# Patient Record
Sex: MALE | Race: ASIAN | NOT HISPANIC OR LATINO | Employment: FULL TIME | URBAN - METROPOLITAN AREA
[De-identification: names, ages, dates, MRNs, and addresses within clinical notes are randomized per-mention and may not be internally consistent; named-entity substitution may affect disease eponyms.]

---

## 2017-04-03 ENCOUNTER — OFFICE VISIT (OUTPATIENT)
Dept: FAMILY MEDICINE | Facility: CLINIC | Age: 47
End: 2017-04-03
Payer: COMMERCIAL

## 2017-04-03 ENCOUNTER — RADIANT APPOINTMENT (OUTPATIENT)
Dept: GENERAL RADIOLOGY | Facility: CLINIC | Age: 47
End: 2017-04-03
Attending: PHYSICIAN ASSISTANT
Payer: COMMERCIAL

## 2017-04-03 VITALS
RESPIRATION RATE: 18 BRPM | DIASTOLIC BLOOD PRESSURE: 86 MMHG | WEIGHT: 156 LBS | BODY MASS INDEX: 26.36 KG/M2 | SYSTOLIC BLOOD PRESSURE: 136 MMHG | HEART RATE: 90 BPM | TEMPERATURE: 98 F | OXYGEN SATURATION: 98 %

## 2017-04-03 DIAGNOSIS — J20.9 ACUTE BRONCHITIS, UNSPECIFIED ORGANISM: ICD-10-CM

## 2017-04-03 DIAGNOSIS — R05.9 COUGH: Primary | ICD-10-CM

## 2017-04-03 PROCEDURE — 71020 XR CHEST 2 VW: CPT

## 2017-04-03 PROCEDURE — 99213 OFFICE O/P EST LOW 20 MIN: CPT | Performed by: PHYSICIAN ASSISTANT

## 2017-04-03 RX ORDER — PREDNISONE 20 MG/1
40 TABLET ORAL DAILY
Qty: 10 TABLET | Refills: 0 | Status: SHIPPED | OUTPATIENT
Start: 2017-04-03 | End: 2017-04-08

## 2017-04-03 RX ORDER — CODEINE PHOSPHATE AND GUAIFENESIN 10; 100 MG/5ML; MG/5ML
1 SOLUTION ORAL EVERY 4 HOURS PRN
Qty: 120 ML | Refills: 0 | Status: SHIPPED | OUTPATIENT
Start: 2017-04-03 | End: 2017-05-30

## 2017-04-03 RX ORDER — ALBUTEROL SULFATE 90 UG/1
2 AEROSOL, METERED RESPIRATORY (INHALATION) EVERY 6 HOURS PRN
Qty: 1 INHALER | Refills: 1 | Status: SHIPPED | OUTPATIENT
Start: 2017-04-03 | End: 2017-04-18

## 2017-04-03 NOTE — MR AVS SNAPSHOT
"              After Visit Summary   4/3/2017    Christ Gomes    MRN: [id number]           Patient Information     Date Of Birth          1970        Visit Information        Provider Department      4/3/2017 10:20 AM Tone Vanessa PA-C [facility] [place]        Today's Diagnoses     Cough    -  1    Acute bronchitis, unspecified organism           Follow-ups after your visit        Your next 10 appointments already scheduled     Apr 03, 2017 11:40 AM CDT   XR CHEST 2 VIEWS with [id number]   [facility] [place] ([facility] [place])    [address]  [place] [id number] [phone]           Please bring a list of your current medicines to your exam. (Include vitamins, minerals and over-thecounter medicines.) Leave your valuables at home.  Tell your doctor if there is a chance you may be pregnant.  You do not need to do anything special for this exam.              Who to contact     Normal or non-critical lab and imaging results will be communicated to you by [employer], letter or phone within 4 business days after the clinic has received the results. If you do not hear from us within 7 days, please contact the clinic through [employer] or phone. If you have a critical or abnormal lab result, we will notify you by phone as soon as possible.  Submit refill requests through [employer] or call your pharmacy and they will forward the refill request to us. Please allow 3 business days for your refill to be completed.          If you need to speak with a  for additional information , please call: [phone]             Additional Information About Your Visit        [employer] Information     [employer] lets you send messages to your doctor, view your test results, renew your prescriptions, schedule appointments and more. To sign up, go to [email or web address] . Click on \"Log in\" on the left side of the screen, which will take you to the Welcome " "page. Then click on \"Sign up Now\" on the right side of the page.     You will be asked to enter the access code listed below, as well as some personal information. Please follow the directions to create your username and password.     Your access code is: [email or web address]  Expires: 2017 11:36 AM     Your access code will  in 90 days. If you need help or a new code, please call your [facility] clinic or [phone].        Care EveryWhere ID     This is your Care EveryWhere ID. This could be used by other organizations to access your [place] medical records  [id number]        Your Vitals Were     Pulse Temperature Respirations Pulse Oximetry BMI (Body Mass Index)       90 98  F (36.7  C) (Tympanic) 18 98% 26.36 kg/m2        Blood Pressure from Last 3 Encounters:   17 136/86   16 (!) 124/96   16 132/80    Weight from Last 3 Encounters:   17 156 lb (70.8 kg)   16 155 lb (70.3 kg)   16 158 lb (71.7 kg)              We Performed the Following     XR Chest 2 Views          Today's Medication Changes          These changes are accurate as of: 4/3/17 11:36 AM.  If you have any questions, ask your nurse or doctor.               Start taking these medicines.        Dose/Directions    guaiFENesin-codeine 100-10 MG/5ML Soln solution   Commonly known as:  ROBITUSSIN AC   Used for:  Cough   Started by:  Tone Vanessa PA-C        Dose:  1 tsp.   Take 5 mLs by mouth every 4 hours as needed for cough   Quantity:  120 mL   Refills:  0       predniSONE 20 MG tablet   Commonly known as:  DELTASONE   Used for:  Acute bronchitis, unspecified organism   Started by:  Tone Vanessa PA-C        Dose:  40 mg   Take 2 tablets (40 mg) by mouth daily for 5 days   Quantity:  10 tablet   Refills:  0            Where to get your medicines      These medications were sent to [facility] [id number] [place] [address], [place] - [address] AT [facility] [address]  [address], " [place] [address]     Phone:  [phone]     albuterol 108 (90 BASE) MCG/ACT Inhaler    predniSONE 20 MG tablet         Some of these will need a paper prescription and others can be bought over the counter.  Ask your nurse if you have questions.     Bring a paper prescription for each of these medications     guaiFENesin-codeine 100-10 MG/5ML Soln solution                Primary Care Provider Office Phone # Fax #    Tone Vanessa PA-C [phone]       [facility] [address]  [place] [address]        Thank you!     Thank you for choosing [facility]  for your care. Our goal is always to provide you with excellent care. Hearing back from our patients is one way we can continue to improve our services. Please take a few minutes to complete the written survey that you may receive in the mail after your visit with us. Thank you!             Your Updated Medication List - Protect others around you: Learn how to safely use, store and throw away your medicines at www.disposemymeds.org.          This list is accurate as of: 4/3/17 11:36 AM.  Always use your most recent med list.                   Brand Name Dispense Instructions for use    albuterol 108 (90 BASE) MCG/ACT Inhaler    PROAIR HFA/PROVENTIL HFA/VENTOLIN HFA    1 Inhaler    Inhale 2 puffs into the lungs every 6 hours as needed for shortness of breath / dyspnea or wheezing       guaiFENesin-codeine 100-10 MG/5ML Soln solution    ROBITUSSIN AC    120 mL    Take 5 mLs by mouth every 4 hours as needed for cough       lisinopril 5 MG tablet    PRINIVIL/ZESTRIL    90 tablet    Take 1 tablet (5 mg) by mouth daily       predniSONE 20 MG tablet    DELTASONE    10 tablet    Take 2 tablets (40 mg) by mouth daily for 5 days

## 2017-04-03 NOTE — PROGRESS NOTES
SUBJECTIVE:                                                    Christ Gomes is a 46 year old male who presents to clinic today for the following health issues:      Acute Illness   Acute illness concerns: cough  Onset: 1 week    Fever: no    Chills/Sweats: YES    Headache (location?): YES    Sinus Pressure:no    Conjunctivitis:  no    Ear Pain: no    Rhinorrhea: YES    Congestion: YES    Sore Throat: no     Cough: YES-productive of clear sputum    Wheeze: no    Decreased Appetite: no    Nausea: no    Vomiting: no    Diarrhea:  no    Dysuria/Freq.: no    Fatigue/Achiness: YES    Sick/Strep Exposure: no     Therapies Tried and outcome: cough syrup          Problem list and histories reviewed & adjusted, as indicated.  Additional history: as documented        Reviewed and updated as needed this visit by clinical staff  Tobacco  Allergies  Meds  Med Hx  Surg Hx  Fam Hx  Soc Hx      Reviewed and updated as needed this visit by Provider         All other systems negative except as outline above  OBJECTIVE:  ENT exam reveals - bilateral TM fluid noted, neck without nodes, throat normal without erythema or exudate, sinuses nontender, post nasal drip noted, nasal mucosa congested and nasal mucosa pale and congested.  CHEST:no tachypnea, retractions or cyanosis, mild inspiratory wheezing heard diffusely throughout both lungs, mild expiratory wheezing heard diffusely throughout both lungs and S1, S2 normal, no murmur, no gallop, rate regular.    Christ was seen today for cough.    Diagnoses and all orders for this visit:    Cough  -     guaiFENesin-codeine (ROBITUSSIN AC) 100-10 MG/5ML SOLN solution; Take 5 mLs by mouth every 4 hours as needed for cough    Acute bronchitis, unspecified organism  -     XR Chest 2 Views  -     albuterol (PROAIR HFA/PROVENTIL HFA/VENTOLIN HFA) 108 (90 BASE) MCG/ACT Inhaler; Inhale 2 puffs into the lungs every 6 hours as needed for shortness of breath / dyspnea or wheezing  -     predniSONE  (DELTASONE) 20 MG tablet; Take 2 tablets (40 mg) by mouth daily for 5 days    Other orders  -     Cancel: XR Chest 2 Views; Future      Advised supportive and symptomatic treatment.  Follow up with Provider - if condition persists or worsens.

## 2017-04-18 ENCOUNTER — OFFICE VISIT (OUTPATIENT)
Dept: FAMILY MEDICINE | Facility: CLINIC | Age: 47
End: 2017-04-18
Payer: COMMERCIAL

## 2017-04-18 VITALS
TEMPERATURE: 98.2 F | BODY MASS INDEX: 26.02 KG/M2 | WEIGHT: 156.2 LBS | DIASTOLIC BLOOD PRESSURE: 83 MMHG | OXYGEN SATURATION: 97 % | SYSTOLIC BLOOD PRESSURE: 136 MMHG | HEART RATE: 85 BPM | HEIGHT: 65 IN

## 2017-04-18 DIAGNOSIS — J20.9 ACUTE BRONCHITIS WITH SYMPTOMS > 10 DAYS: Primary | ICD-10-CM

## 2017-04-18 PROCEDURE — 99214 OFFICE O/P EST MOD 30 MIN: CPT | Performed by: NURSE PRACTITIONER

## 2017-04-18 RX ORDER — PREDNISONE 20 MG/1
40 TABLET ORAL DAILY
Qty: 10 TABLET | Refills: 0 | Status: SHIPPED | OUTPATIENT
Start: 2017-04-18 | End: 2017-04-23

## 2017-04-18 RX ORDER — ALBUTEROL SULFATE 90 UG/1
AEROSOL, METERED RESPIRATORY (INHALATION)
Refills: 1 | COMMUNITY
Start: 2017-04-03 | End: 2018-06-18

## 2017-04-18 RX ORDER — AZITHROMYCIN 250 MG/1
TABLET, FILM COATED ORAL
Qty: 6 TABLET | Refills: 0 | Status: SHIPPED | OUTPATIENT
Start: 2017-04-18 | End: 2017-05-30

## 2017-04-18 RX ORDER — BENZONATATE 100 MG/1
100-200 CAPSULE ORAL 3 TIMES DAILY PRN
Qty: 42 CAPSULE | Refills: 0 | Status: SHIPPED | OUTPATIENT
Start: 2017-04-18 | End: 2017-05-30

## 2017-04-18 NOTE — MR AVS SNAPSHOT
"              After Visit Summary   4/18/2017    Gaby Gomes    MRN: [id number]           Patient Information     Date Of Birth          1970        Visit Information        Provider Department      4/18/2017 5:00 PM Viji Scott NP [facility]        Today's Diagnoses     Acute bronchitis with symptoms > 10 days    -  1      Care Instructions      Viêm Ph? Qu?n [Bronchitis, Adult: Abx Tx]  VIÊM PH? QU?N (BRONCHITIS) là debbie?m trùng ???ng khí [ ?ng ph? qu?n  (\"bronchial tubes\")]. Vi?c này th??ng x?y ra khi c?m l?nh thông th??ng. Các tri?u ch?ng g?m ho có ??m [ (phlegm)] và s?t nh?. Viêm ph? qu?n th??ng francis huddleston romina 7 ??n 14 ngày. Các tr??ng h?p nh? có th? ?i?u tr? b?ng các bi?n pháp ch?a tr? ??n gi?n t?i suhail . Debbie?m trùng n?ng h?n ???c ?i?u tr? b?ng tr? sinh.    Ch?m Sóc T?i Suhail :  1) N?u có các tri?u ch?ng nghiêm tr?ng, hãy ngh? ng ?i t?i nhà gaby 2 ??n 3 ngày ??u. Khi qu ý v? ho?t ??ng tr? l?i, ??ng ?? cho quá m?t.  2) ??ng hút thu?c lá. Tránh ti?p xúc v?i khói thu?c c?a ng??i khác.  3) Quý v? có th? dùng acetaminophen (Tylenol) ho?c ibuprofen (Motrin, Advil) ?? ki?m ch? c?n s?t hay ?au, tr? khi ???c ch? ??nh m?t lo?i thu?c khác ?? ch?a tr?. [L?U Ý: N?u quý v? b? b?nh norbert ho?c th?n mãn tính ho?c t?ng b? loét leroy t? (stomach ulcer) ho?c bernard?t ramin?t ???ng tiêu hóa (GI bleeding), h ãy bàn v?i bác s? c?a quý v? tr ??c khi dùng các lo?i thu?c này.]  4) Quý v? có th? b? kém ?n , do ?ó m?t ch? ?? ?n nh? là t?t. Tránh ??ng cho m?t n??c gaby c? th? b?ng cách u?ng t? 6 ??n 8 ly ch?t l?ng m?i ngày (n??c, n??c ng?t, n??c ép trái cây, trà, súp v.v ). Ch?t l?ng s? giúp làm l?ng ch?t bài ti?t gaby ph?i.  5) Thu?c ho bán không c?n toa có ch?a \"dextromethorphan\" (nh? Robitussin DM) và thu?c thông m?i (decongestants) (Actifed ho?c Sudafed) có th? giúp gi?m c?n ho ho?c ngh?t m?i. [L?U Ý: ??ng d ùng thu?c thông m?i n?u quý v? b? conroy ramin?t áp.]  6) U?ng h?t thu?c tr? sinh cho d?u quý v? c?m th?y ?? h ?n romina vài " ngày.  Ti?p T?c Rohan Dõi  v?i bác s? c?a quý v? ho?c rohan nh?ng gì ? ã ???c h??ng d?n n?u không th?y ?? h?n romina ba ngày.  [L?U Ý: N?u quý v? t? 65 tu?i tr? lên, ho?c n?u quý v? b? leslie?n mãn tính (chronic asthma) hay b? b?nh ngh?n ph?i mãn tính (COPD), chúng tôi ?? ngh? quý v? hãy CH?NG NG?A VIÊM PH?I ( PNEUMOCOCCAL VACCINATION) m?i n?m n?m và CH?NG NG?A CÚM [ INFLUENZAVACCINATION (FLU-SHOT)] hàng n?m vào mùa thu. H?i bác s? c?a quý v? v? vi?c này. N?u quý v? ? ã ch?p hình hannah genia?n, bác s? chuyên katja hannah genia?n s? xem l?i vi?c này. Quý v? s? ???c thông báo v? b?t k? khám phá m?i nào có th? ?nh h??ng ??n vi?c ch?m sóc c ?a quý v?.]  N?u x?y ra b?t c? ?i?u nào romina ?ây hãy L?P T?C ?I?U TR? Y T?:  -- S?t trên 100.4  F (38.0  C) quá ba ngày  -- Khó th?, th? khò khè ho?c ?au khi th?  -- Ho ra máu ho?c l??ng ?àm có màu suhail t?ng  -- Y?u ?t, l? m?, nh?c ??u, ?au n?i m?t, ?au lakeshia ho?c c? b? c?ng       8151-5642 [employer]. [address] [place] [address], [place] [address]. All rights reserved. This information is not intended as a substitute for professional medical care. Always follow your healthcare professional's instructions.              Follow-ups after your visit        Follow-up notes from your care team     Return if symptoms worsen or fail to improve.      Who to contact     Normal or non-critical lab and imaging results will be communicated to you by MyChart, letter or phone within 4 business days after the clinic has received the results. If you do not hear from us within 7 days, please contact the clinic through MyChart or phone. If you have a critical or abnormal lab result, we will notify you by phone as soon as possible.  Submit refill requests through [employer] or call your pharmacy and they will forward the refill request to us. Please allow 3 business days for your refill to be completed.          If you need to speak with a  for additional information , please call:  "[phone]             Additional Information About Your Visit        Care EveryWhere ID     This is your Care EveryWhere ID. This could be used by other organizations to access your [place] medical records  [id number]        Your Vitals Were     Pulse Temperature Height Pulse Oximetry BMI (Body Mass Index)       85 98.2  F (36.8  C) (Oral) 5' 4.57\" (1.64 m) 97% 26.34 kg/m2        Blood Pressure from Last 3 Encounters:   04/18/17 136/83   04/03/17 136/86   11/22/16 (!) 124/96    Weight from Last 3 Encounters:   04/18/17 156 lb 3.2 oz (70.9 kg)   04/03/17 156 lb (70.8 kg)   11/22/16 155 lb (70.3 kg)              Today, you had the following     No orders found for display         Today's Medication Changes          These changes are accurate as of: 4/18/17  5:08 PM.  If you have any questions, ask your nurse or doctor.               Start taking these medicines.        Dose/Directions    azithromycin 250 MG tablet   Commonly known as:  ZITHROMAX   Used for:  Acute bronchitis with symptoms > 10 days   Started by:  Viji Scott NP        Two tablets first day, then one tablet daily for four days.   Quantity:  6 tablet   Refills:  0       benzonatate 100 MG capsule   Commonly known as:  TESSALON   Used for:  Acute bronchitis with symptoms > 10 days   Started by:  Viji Scott NP        Dose:  100-200 mg   Take 1-2 capsules (100-200 mg) by mouth 3 times daily as needed for cough   Quantity:  42 capsule   Refills:  0       predniSONE 20 MG tablet   Commonly known as:  DELTASONE   Used for:  Acute bronchitis with symptoms > 10 days   Started by:  Viji Scott NP        Dose:  40 mg   Take 2 tablets (40 mg) by mouth daily for 5 days   Quantity:  10 tablet   Refills:  0         These medicines have changed or have updated prescriptions.        Dose/Directions    albuterol 108 (90 BASE) MCG/ACT Inhaler   This may have changed:  Another medication with the same name was removed. Continue taking this " medication, and follow the directions you see here.   Generic drug:  albuterol   Changed by:  Viji Scott, ABEL        INHALE 2 PUFFS INTO THE LUNGS Q 6 H PRF SOB   Refills:  1            Where to get your medicines      These medications were sent to [id number] - [place], [place] - [address] AT [facility] [address]  [address], [place] [address]     Phone:  [phone]     azithromycin 250 MG tablet    benzonatate 100 MG capsule    predniSONE 20 MG tablet                Primary Care Provider Office Phone # Fax #    Tone Vanessa PA-C [phone]       [facility] [address]  [place] [address]        Thank you!     Thank you for choosing [facility]  for your care. Our goal is always to provide you with excellent care. Hearing back from our patients is one way we can continue to improve our services. Please take a few minutes to complete the written survey that you may receive in the mail after your visit with us. Thank you!             Your Updated Medication List - Protect others around you: Learn how to safely use, store and throw away your medicines at www.disposemymeds.org.          This list is accurate as of: 4/18/17  5:08 PM.  Always use your most recent med list.                   Brand Name Dispense Instructions for use    albuterol 108 (90 BASE) MCG/ACT Inhaler   Generic drug:  albuterol      INHALE 2 PUFFS INTO THE LUNGS Q 6 H PRF SOB       azithromycin 250 MG tablet    ZITHROMAX    6 tablet    Two tablets first day, then one tablet daily for four days.       benzonatate 100 MG capsule    TESSALON    42 capsule    Take 1-2 capsules (100-200 mg) by mouth 3 times daily as needed for cough       guaiFENesin-codeine 100-10 MG/5ML Soln solution    ROBITUSSIN AC    120 mL    Take 5 mLs by mouth every 4 hours as needed for cough       lisinopril 5 MG tablet    PRINIVIL/ZESTRIL    90 tablet    Take 1 tablet (5 mg) by mouth  daily       predniSONE 20 MG tablet    DELTASONE    10 tablet    Take 2 tablets (40 mg) by mouth daily for 5 days

## 2017-04-18 NOTE — PROGRESS NOTES
SUBJECTIVE:                                                    Christ Gomes is a 46 year old male who presents to clinic today for the following health issues:      Following up on: cough      Last visit this was discussed: 4/3/17 with FIDEL    Progression of Symptoms:  Worsening; wheezing    Accompanying Signs & Symptoms: none    Taking Medication as prescribed: yes    Side Effects:  None    Medication Helping Symptoms:  NO         Problem list and histories reviewed & adjusted, as indicated.  Additional history: as documented    Patient Active Problem List   Diagnosis     Hyperlipidemia LDL goal <160     History of hepatitis B     Mild intermittent asthma without complication     Benign essential hypertension     Past Surgical History:   Procedure Laterality Date     ENT SURGERY  6-29-15    Removal of anterior neck mass- Dr. Willard     EXCISE MASS NECK Right 6/29/2015    Procedure: EXCISE MASS NECK;  Surgeon: Florentino Willard MD;  Location:  OR       Social History   Substance Use Topics     Smoking status: Never Smoker     Smokeless tobacco: Never Used     Alcohol use Yes      Comment: social use     Family History   Problem Relation Age of Onset     Hypertension Mother      Hypertension Sister      Hypertension Sister          Current Outpatient Prescriptions   Medication Sig Dispense Refill     azithromycin (ZITHROMAX) 250 MG tablet Two tablets first day, then one tablet daily for four days. 6 tablet 0     predniSONE (DELTASONE) 20 MG tablet Take 2 tablets (40 mg) by mouth daily for 5 days 10 tablet 0     benzonatate (TESSALON) 100 MG capsule Take 1-2 capsules (100-200 mg) by mouth 3 times daily as needed for cough 42 capsule 0     guaiFENesin-codeine (ROBITUSSIN AC) 100-10 MG/5ML SOLN solution Take 5 mLs by mouth every 4 hours as needed for cough 120 mL 0     lisinopril (PRINIVIL,ZESTRIL) 5 MG tablet Take 1 tablet (5 mg) by mouth daily 90 tablet 1     ALBUTEROL 108 (90 BASE) MCG/ACT inhaler INHALE 2 PUFFS INTO  "THE LUNGS Q 6 H PRF SOB  1     No Known Allergies  BP Readings from Last 3 Encounters:   04/18/17 136/83   04/03/17 136/86   11/22/16 (!) 124/96    Wt Readings from Last 3 Encounters:   04/18/17 156 lb 3.2 oz (70.9 kg)   04/03/17 156 lb (70.8 kg)   11/22/16 155 lb (70.3 kg)              Labs reviewed in EPIC    Reviewed and updated as needed this visit by clinical staff  Tobacco  Allergies  Meds  Med Hx  Surg Hx  Fam Hx  Soc Hx      Reviewed and updated as needed this visit by Provider         ROS:  Constitutional, HEENT, cardiovascular, pulmonary, GI, , musculoskeletal, neuro, skin, endocrine and psych systems are negative, except as otherwise noted.    OBJECTIVE:                                                    /83  Pulse 85  Temp 98.2  F (36.8  C) (Oral)  Ht 5' 4.57\" (1.64 m)  Wt 156 lb 3.2 oz (70.9 kg)  SpO2 97%  BMI 26.34 kg/m2  Body mass index is 26.34 kg/(m^2).  GENERAL: healthy, alert and no distress  EYES: Eyes grossly normal to inspection, PERRL and conjunctivae and sclerae normal  HENT: ear canals and R TM's normal, nose and mouth without ulcers or lesions POSITIVE for L TM erythematous, no pain per pt  RESP: POSITIVE for inspiratory and expiratory wheezing, harsh cough observed   CV: regular rate and rhythm, normal S1 S2, no S3 or S4, no murmur, click or rub, no peripheral edema and peripheral pulses strong  SKIN: no suspicious lesions or rashes  PSYCH: mentation appears normal, affect normal/bright  LYMPH: no cervical, supraclavicular, axillary adenopathy    Diagnostic Test Results:  none      ASSESSMENT/PLAN:                                                        BP Screening:   Last 3 BP Readings:    BP Readings from Last 3 Encounters:   04/18/17 136/83   04/03/17 136/86   11/22/16 (!) 124/96       The following was recommended to the patient:  Re-screen BP within a year and recommended lifestyle modifications      ICD-10-CM    1. Acute bronchitis with symptoms > 10 days J20.9 " azithromycin (ZITHROMAX) 250 MG tablet     predniSONE (DELTASONE) 20 MG tablet     benzonatate (TESSALON) 100 MG capsule       See Patient Instructions    MOLLY Reddy  [facility]

## 2017-04-18 NOTE — PATIENT INSTRUCTIONS
"  Viêm Ph? Qu?n [Bronchitis, Adult: Abx Tx]  VIÊM PH? QU?N (BRONCHITIS) là debbie?m trùng ???ng khí [ ?ng ph? qu?n  (\"bronchial tubes\")]. Vi?c này th??ng x?y ra khi c?m l?nh thông th??ng. Các tri?u ch?ng g?m ho có ??m [ (phlegm)] và s?t nh?. Viêm ph? qu?n th??ng kéo dài romina 7 ??n 14 ngày. Các tr??ng h?p nh? có th? ?i?u tr? b?ng các bi?n pháp ch?a tr? ??n gi?n t?i suhail . Debbie?m trùng n?ng h?n ???c ?i?u tr? b?ng tr? sinh.    Ch?m Sóc T?i Suhail :  1) N?u có các tri?u ch?ng nghiêm tr?ng, hãy ngh? ng ?i t?i nhà gaby 2 ??n 3 ngày ??u. Khi qu ý v? ho?t ??ng tr? l?i, ??ng ?? cho quá m?t.  2) ??ng hút thu?c lá. Tránh ti?p xúc v?i khói thu?c c?a ng??i khác.  3) Quý v? có th? dùng acetaminophen (Tylenol) ho?c ibuprofen (Motrin, Advil) ?? ki?m ch? c?n s?t hay ?au, tr? khi ???c ch? ??nh m?t lo?i thu?c khác ?? ch?a tr?. [L?U Ý: N?u quý v? b? b?nh norbert ho?c th?n mãn tính ho?c t?ng b? loét leroy t? (stomach ulcer) ho?c bernard?t ramin?t ???ng tiêu hóa (GI bleeding), h ãy bàn v?i bác s? c?a quý v? tr ??c khi dùng các lo?i thu?c này.]  4) Quý v? có th? b? kém ?n , do ?ó m?t ch? ?? ?n nh? là t?t. Tránh ??ng cho m?t n??c gaby c? th? b?ng cách u?ng t? 6 ??n 8 ly ch?t l?ng m?i ngày (n??c, n??c ng?t, n??c ép trái cây, trà, súp v.v ). Ch?t l?ng s? giúp làm l?ng ch?t bài ti?t gaby ph?i.  5) Thu?c ho bán không c?n toa có ch?a \"dextromethorphan\" (nh? Robitussin DM) và thu?c thông m?i (decongestants) (Actifed ho?c Sudafed) có th? giúp gi?m c?n ho ho?c ngh?t m?i. [L?U Ý: ??ng d ùng thu?c thông m?i n?u quý v? b? conroy ramin?t áp.]  6) U?ng h?t thu?c tr? sinh cho d?u quý v? c?m th?y ?? h ?n romina vài ngày.  Ti?p T?c Rohan Dõi  v?i bác s? c?a quý v? ho?c rohan nh?ng gì ? ã ???c h??ng d?n n?u không th?y ?? h?n romina ba ngày.  [L?U Ý: N?u quý v? t? 65 tu?i tr? lên, ho?c n?u quý v? b? leslie?n mãn tính (chronic asthma) hay b? b?nh ngh?n ph?i mãn tính (COPD), chúng tôi ?? ngh? quý v? hãy CH?NG NG?A VIÊM PH?I ( PNEUMOCOCCAL VACCINATION) m?i n?m n?m và CH?NG NG?A CÚM [ " INFLUENZAVACCINATION (FLU-SHOT)] hàng n?m vào mùa thu. H?i bác s? c?a quý v? v? vi?c này. N?u quý v? ? ã ch?p hình hannah genia?n, bác s? chuyên katja hannah genia?n s? xem l?i vi?c này. Quý v? s? ???c thông báo v? b?t k? khám phá m?i nào có th? ?nh h??ng ??n vi?c ch?m sóc c ?a quý v?.]  N?u x?y ra b?t c? ?i?u nào romina ?ây hãy L?P T?C ?I?U TR? Y T?:  -- S?t trên 100.4  F (38.0  C) quá ba ngày  -- Khó th?, th? khò khè ho?c ?au khi th?  -- Ho ra máu ho?c l??ng ?àm có màu suhail t?ng  -- Y?u ?t, l? m?, nh?c ??u, ?au n?i m?t, ?au lakeshia ho?c c? b? c?ng       6543-7343 [employer]. [address] [place] [address], [place] [address]. All rights reserved. This information is not intended as a substitute for professional medical care. Always follow your healthcare professional's instructions.

## 2017-05-30 ENCOUNTER — OFFICE VISIT (OUTPATIENT)
Dept: FAMILY MEDICINE | Facility: CLINIC | Age: 47
End: 2017-05-30
Payer: COMMERCIAL

## 2017-05-30 VITALS
BODY MASS INDEX: 26.02 KG/M2 | HEART RATE: 75 BPM | HEIGHT: 65 IN | WEIGHT: 156.2 LBS | DIASTOLIC BLOOD PRESSURE: 73 MMHG | OXYGEN SATURATION: 99 % | TEMPERATURE: 98.8 F | SYSTOLIC BLOOD PRESSURE: 117 MMHG

## 2017-05-30 DIAGNOSIS — Z00.01 ENCOUNTER FOR ROUTINE ADULT HEALTH EXAMINATION WITH ABNORMAL FINDINGS: Primary | ICD-10-CM

## 2017-05-30 DIAGNOSIS — D72.829 LEUKOCYTOSIS, UNSPECIFIED TYPE: ICD-10-CM

## 2017-05-30 DIAGNOSIS — J45.20 MILD INTERMITTENT ASTHMA WITHOUT COMPLICATION: ICD-10-CM

## 2017-05-30 DIAGNOSIS — I10 BENIGN ESSENTIAL HYPERTENSION: ICD-10-CM

## 2017-05-30 DIAGNOSIS — K62.5 BLOOD PER RECTUM: ICD-10-CM

## 2017-05-30 DIAGNOSIS — R07.89 ATYPICAL CHEST PAIN: ICD-10-CM

## 2017-05-30 LAB
ALBUMIN SERPL-MCNC: 4 G/DL (ref 3.4–5)
ALP SERPL-CCNC: 56 U/L (ref 40–150)
ALT SERPL W P-5'-P-CCNC: 37 U/L (ref 0–70)
ANION GAP SERPL CALCULATED.3IONS-SCNC: 8 MMOL/L (ref 3–14)
AST SERPL W P-5'-P-CCNC: 22 U/L (ref 0–45)
BASOPHILS # BLD AUTO: 0.1 10E9/L (ref 0–0.2)
BASOPHILS NFR BLD AUTO: 0.8 %
BILIRUB SERPL-MCNC: 0.4 MG/DL (ref 0.2–1.3)
BUN SERPL-MCNC: 14 MG/DL (ref 7–30)
CALCIUM SERPL-MCNC: 9.2 MG/DL (ref 8.5–10.1)
CHLORIDE SERPL-SCNC: 102 MMOL/L (ref 94–109)
CO2 SERPL-SCNC: 29 MMOL/L (ref 20–32)
CREAT SERPL-MCNC: 0.92 MG/DL (ref 0.66–1.25)
DIFFERENTIAL METHOD BLD: ABNORMAL
EOSINOPHIL # BLD AUTO: 2.8 10E9/L (ref 0–0.7)
EOSINOPHIL NFR BLD AUTO: 20.7 %
ERYTHROCYTE [DISTWIDTH] IN BLOOD BY AUTOMATED COUNT: 12.7 % (ref 10–15)
GFR SERPL CREATININE-BSD FRML MDRD: 87 ML/MIN/1.7M2
GLUCOSE SERPL-MCNC: 79 MG/DL (ref 70–99)
HCT VFR BLD AUTO: 43.7 % (ref 40–53)
HGB BLD-MCNC: 15.4 G/DL (ref 13.3–17.7)
LYMPHOCYTES # BLD AUTO: 3.6 10E9/L (ref 0.8–5.3)
LYMPHOCYTES NFR BLD AUTO: 27.3 %
MCH RBC QN AUTO: 29.6 PG (ref 26.5–33)
MCHC RBC AUTO-ENTMCNC: 35.2 G/DL (ref 31.5–36.5)
MCV RBC AUTO: 84 FL (ref 78–100)
MONOCYTES # BLD AUTO: 0.9 10E9/L (ref 0–1.3)
MONOCYTES NFR BLD AUTO: 6.8 %
NEUTROPHILS # BLD AUTO: 5.9 10E9/L (ref 1.6–8.3)
NEUTROPHILS NFR BLD AUTO: 44.4 %
PLATELET # BLD AUTO: 224 10E9/L (ref 150–450)
POTASSIUM SERPL-SCNC: 4.1 MMOL/L (ref 3.4–5.3)
PROT SERPL-MCNC: 7.7 G/DL (ref 6.8–8.8)
RBC # BLD AUTO: 5.21 10E12/L (ref 4.4–5.9)
SODIUM SERPL-SCNC: 139 MMOL/L (ref 133–144)
WBC # BLD AUTO: 13.3 10E9/L (ref 4–11)

## 2017-05-30 PROCEDURE — 99396 PREV VISIT EST AGE 40-64: CPT | Performed by: PHYSICIAN ASSISTANT

## 2017-05-30 PROCEDURE — 99213 OFFICE O/P EST LOW 20 MIN: CPT | Mod: 25 | Performed by: PHYSICIAN ASSISTANT

## 2017-05-30 PROCEDURE — 93000 ELECTROCARDIOGRAM COMPLETE: CPT | Performed by: PHYSICIAN ASSISTANT

## 2017-05-30 PROCEDURE — 36415 COLL VENOUS BLD VENIPUNCTURE: CPT | Performed by: PHYSICIAN ASSISTANT

## 2017-05-30 PROCEDURE — 80053 COMPREHEN METABOLIC PANEL: CPT | Performed by: PHYSICIAN ASSISTANT

## 2017-05-30 PROCEDURE — 85025 COMPLETE CBC W/AUTO DIFF WBC: CPT | Performed by: PHYSICIAN ASSISTANT

## 2017-05-30 RX ORDER — LOSARTAN POTASSIUM 25 MG/1
25 TABLET ORAL DAILY
Qty: 90 TABLET | Refills: 1 | Status: SHIPPED | OUTPATIENT
Start: 2017-05-30 | End: 2018-03-15

## 2017-05-30 NOTE — MR AVS SNAPSHOT
After Visit Summary   5/30/2017    Christ Gomes    MRN: [id number]           Patient Information     Date Of Birth          1970        Visit Information        Provider Department      5/30/2017 4:00 PM Tone Vanessa PA-C [facility]        Today's Diagnoses     Encounter for routine adult health examination with abnormal findings    -  1    Benign essential hypertension        Blood per rectum        Atypical chest pain        Mild intermittent asthma without complication          Care Instructions      Preventive Health Recommendations  Male Ages 40 to 49    Yearly exam:             See your health care provider every year in order to  o   Review health changes.   o   Discuss preventive care.    o   Review your medicines if your doctor has prescribed any.    You should be tested each year for STDs (sexually transmitted diseases) if you re at risk.     Have a cholesterol test every 5 years.     Have a colonoscopy (test for colon cancer) if someone in your family has had colon cancer or polyps before age 50.     After age 45, have a diabetes test (fasting glucose). If you are at risk for diabetes, you should have this test every 3 years.      Talk with your health care provider about whether or not a prostate cancer screening test (PSA) is right for you.    Shots: Get a flu shot each year. Get a tetanus shot every 10 years.     Nutrition:    Eat at least 5 servings of fruits and vegetables daily.     Eat whole-grain bread, whole-wheat pasta and brown rice instead of white grains and rice.     Talk to your provider about Calcium and Vitamin D.     Lifestyle    Exercise for at least 150 minutes a week (30 minutes a day, 5 days a week). This will help you control your weight and prevent disease.     Limit alcohol to one drink per day.     No smoking.     Wear sunscreen to prevent skin cancer.     See your dentist every six months for an exam and cleaning.              Follow-ups after  your visit        Additional Services     GASTROENTEROLOGY ADULT REF PROCEDURE ONLY       Last Lab Result: Creatinine (mg/dL)       Date                     Value                 04/02/2016               0.72             ----------  Body mass index is 26.4 kg/(m^2).      Patient will be contacted to schedule procedure.     Please be aware that coverage of these services is subject to the terms and limitations of your health insurance plan.  Call member services at your health plan with any benefit or coverage questions.  Any procedures must be performed at a [facility] facility OR coordinated by your clinic's referral office.    Please bring the following with you to your appointment:    (1) Any X-Rays, CTs or MRIs which have been performed.  Contact the facility where they were done to arrange for  prior to your scheduled appointment.    (2) List of current medications   (3) This referral request   (4) Any documents/labs given to you for this referral                  Future tests that were ordered for you today     Open Future Orders        Priority Expected Expires Ordered    NM Exercise stress test Routine  5/30/2018 5/30/2017            Who to contact     Normal or non-critical lab and imaging results will be communicated to you by MyChart, letter or phone within 4 business days after the clinic has received the results. If you do not hear from us within 7 days, please contact the clinic through MyChart or phone. If you have a critical or abnormal lab result, we will notify you by phone as soon as possible.  Submit refill requests through [facility] or call your pharmacy and they will forward the refill request to us. Please allow 3 business days for your refill to be completed.          If you need to speak with a  for additional information , please call: [phone]             Additional Information About Your Visit        Care EveryWhere ID     This is your Care EveryWhere ID. This  "could be used by other organizations to access your [facility] medical records  [id number]        Your Vitals Were     Pulse Temperature Height Pulse Oximetry BMI (Body Mass Index)       75 98.8  F (37.1  C) (Tympanic) 5' 4.5\" (1.638 m) 99% 26.4 kg/m2        Blood Pressure from Last 3 Encounters:   05/30/17 117/73   04/18/17 136/83   04/03/17 136/86    Weight from Last 3 Encounters:   05/30/17 156 lb 3.2 oz (70.9 kg)   04/18/17 156 lb 3.2 oz (70.9 kg)   04/03/17 156 lb (70.8 kg)              We Performed the Following     CBC with platelets differential     Comprehensive metabolic panel     EKG 12-lead complete w/read - Clinics     GASTROENTEROLOGY ADULT REF PROCEDURE ONLY          Today's Medication Changes          These changes are accurate as of: 5/30/17  4:43 PM.  If you have any questions, ask your nurse or doctor.               Start taking these medicines.        Dose/Directions    losartan 25 MG tablet   Commonly known as:  COZAAR   Used for:  Benign essential hypertension   Started by:  Tone Vanessa PA-C        Dose:  25 mg   Take 1 tablet (25 mg) by mouth daily   Quantity:  90 tablet   Refills:  1       mometasone-formoterol 100-5 MCG/ACT oral inhaler   Commonly known as:  DULERA   Used for:  Mild intermittent asthma without complication   Started by:  Tone Vanessa PA-C        Dose:  2 puff   Inhale 2 puffs into the lungs 2 times daily   Quantity:  39 g   Refills:  1         Stop taking these medicines if you haven't already. Please contact your care team if you have questions.     lisinopril 5 MG tablet   Commonly known as:  PRINIVIL/ZESTRIL   Stopped by:  Tone Vanessa PA-C                Where to get your medicines      These medications were sent to [id number]  [facility] - [address] AT [facility] [address], [place] [address]     Phone:  [phone]     losartan 25 MG tablet    mometasone-formoterol 100-5 MCG/ACT oral " inhaler                Primary Care Provider Office Phone # Fax #    Tone Vanessa PA-C [phone]       [facility] [address] [place] [address]        Thank you!     Thank you for choosing [facility]  for your care. Our goal is always to provide you with excellent care. Hearing back from our patients is one way we can continue to improve our services. Please take a few minutes to complete the written survey that you may receive in the mail after your visit with us. Thank you!             Your Updated Medication List - Protect others around you: Learn how to safely use, store and throw away your medicines at www.disposemymeds.org.          This list is accurate as of: 5/30/17  4:43 PM.  Always use your most recent med list.                   Brand Name Dispense Instructions for use    albuterol 108 (90 BASE) MCG/ACT Inhaler   Generic drug:  albuterol      INHALE 2 PUFFS INTO THE LUNGS Q 6 H PRF SOB       losartan 25 MG tablet    COZAAR    90 tablet    Take 1 tablet (25 mg) by mouth daily       mometasone-formoterol 100-5 MCG/ACT oral inhaler    DULERA    39 g    Inhale 2 puffs into the lungs 2 times daily

## 2017-05-30 NOTE — PROGRESS NOTES
SUBJECTIVE:     CC: Christ Gomes is an 47 year old male who presents for preventative health visit.     Healthy Habits:    Do you get at least three servings of calcium containing foods daily (dairy, green leafy vegetables, etc.)? yes    Amount of exercise or daily activities, outside of work: 3-4 day(s) per week    Problems taking medications regularly No    Medication side effects: No    Have you had an eye exam in the past two years? yes    Do you see a dentist twice per year? yes    Do you have sleep apnea, excessive snoring or daytime drowsiness?no    Noting blood in his stool lately.  Some exertional chest pain over the past several months.  Recheck of blood pressure. Cough related to lisinopril      Today's PHQ-2 Score:   PHQ-2 ( 1999 Pfizer) 5/30/2017 4/3/2017   Q1: Little interest or pleasure in doing things 0 0   Q2: Feeling down, depressed or hopeless 0 0   PHQ-2 Score 0 0       Abuse: Current or Past(Physical, Sexual or Emotional)- No  Do you feel safe in your environment - Yes    Social History   Substance Use Topics     Smoking status: Never Smoker     Smokeless tobacco: Never Used     Alcohol use Yes      Comment: social use     The patient does not drink >3 drinks per day nor >7 drinks per week.    Last PSA:   PSA   Date Value Ref Range Status   04/02/2016 0.83 0 - 4 ug/L Final       Recent Labs   Lab Test  04/02/16   1030   CHOL  205*   HDL  59   LDL  127*   TRIG  96   NHDL  146*       Reviewed orders with patient. Reviewed health maintenance and updated orders accordingly - Yes    Reviewed and updated as needed this visit by clinical staff  Tobacco  Allergies  Meds         Reviewed and updated as needed this visit by Provider        Past Medical History:   Diagnosis Date     History of hepatitis B       Past Surgical History:   Procedure Laterality Date     ENT SURGERY  6-29-15    Removal of anterior neck mass- Dr. Willard     EXCISE MASS NECK Right 6/29/2015    Procedure: EXCISE MASS NECK;  Surgeon:  "Florentino Willard MD;  Location: [facility] OR       ROS:  C: NEGATIVE for fever, chills, change in weight  I: NEGATIVE for worrisome rashes, moles or lesions  E: NEGATIVE for vision changes or irritation  ENT: NEGATIVE for ear, mouth and throat problems  R: NEGATIVE for significant cough or SOB  CV: NEGATIVE for chest pain, palpitations or peripheral edema  GI: NEGATIVE for nausea, abdominal pain, heartburn, or change in bowel habits   male: negative for dysuria, hematuria, decreased urinary stream, erectile dysfunction, urethral discharge  M: NEGATIVE for significant arthralgias or myalgia  N: NEGATIVE for weakness, dizziness or paresthesias  P: NEGATIVE for changes in mood or affect    Problem list, Medication list, Allergies, and Medical/Social/Surgical histories reviewed in EPIC and updated as appropriate.  BP Readings from Last 3 Encounters:   05/30/17 117/73   04/18/17 136/83   04/03/17 136/86    Wt Readings from Last 3 Encounters:   05/30/17 156 lb 3.2 oz (70.9 kg)   04/18/17 156 lb 3.2 oz (70.9 kg)   04/03/17 156 lb (70.8 kg)                  OBJECTIVE:     /73  Pulse 75  Temp 98.8  F (37.1  C) (Tympanic)  Ht 5' 4.5\" (1.638 m)  Wt 156 lb 3.2 oz (70.9 kg)  SpO2 99%  BMI 26.4 kg/m2  EXAM:  GENERAL: healthy, alert and no distress  EYES: Eyes grossly normal to inspection, PERRL and conjunctivae and sclerae normal  HENT: ear canals and TM's normal, nose and mouth without ulcers or lesions  NECK: no adenopathy, no asymmetry, masses, or scars and thyroid normal to palpation  RESP: lungs clear to auscultation - no rales, rhonchi or wheezes  CV: regular rate and rhythm, normal S1 S2, no S3 or S4, no murmur, click or rub, no peripheral edema and peripheral pulses strong  ABDOMEN: soft, nontender, no hepatosplenomegaly, no masses and bowel sounds normal  MS: no gross musculoskeletal defects noted, no edema  SKIN: no suspicious lesions or rashes  NEURO: Normal strength and tone, mentation intact and speech " "normal  PSYCH: mentation appears normal, affect normal/bright  Non thrombosed external hemorrhoids present  ASSESSMENT/PLAN:         ICD-10-CM    1. Encounter for routine adult health examination with abnormal findings Z00.01    2. Benign essential hypertension I10 losartan (COZAAR) 25 MG tablet   3. Blood per rectum K62.5 GASTROENTEROLOGY ADULT REF PROCEDURE ONLY   4. Atypical chest pain R07.89 NM Exercise stress test       COUNSELING:  Reviewed preventive health counseling, as reflected in patient instructions         reports that he has never smoked. He has never used smokeless tobacco.    Estimated body mass index is 26.4 kg/(m^2) as calculated from the following:    Height as of this encounter: 5' 4.5\" (1.638 m).    Weight as of this encounter: 156 lb 3.2 oz (70.9 kg).       Counseling Resources:  ATP IV Guidelines  Pooled Cohorts Equation Calculator  FRAX Risk Assessment  ICSI Preventive Guidelines  Dietary Guidelines for Americans, 2010  USDA's MyPlate  ASA Prophylaxis  Lung CA Screening    Tone Vanessa PA-C  [facility] [place]  "

## 2017-05-30 NOTE — LETTER
[facility] [place]  [address] [place] [id number] [phone]    June 12, 2017       Christ   [address] [place] [address]        Christ     Overall, your lab work came back looking just fine. Your wbc came back a shade elevated. This is of no real concerning significance at this time, but i would like it rechecked in a week or two via a lab only visit.    Results for orders placed or performed in visit on 05/30/17   CBC with platelets differential   Result Value Ref Range    WBC 13.3 (H) 4.0 - 11.0 10e9/L    RBC Count 5.21 4.4 - 5.9 10e12/L    Hemoglobin 15.4 13.3 - 17.7 g/dL    Hematocrit 43.7 40.0 - 53.0 %    MCV 84 78 - 100 fl    MCH 29.6 26.5 - 33.0 pg    MCHC 35.2 31.5 - 36.5 g/dL    RDW 12.7 10.0 - 15.0 %    Platelet Count 224 150 - 450 10e9/L    Diff Method Automated Method     % Neutrophils 44.4 %    % Lymphocytes 27.3 %    % Monocytes 6.8 %    % Eosinophils 20.7 %    % Basophils 0.8 %    Absolute Neutrophil 5.9 1.6 - 8.3 10e9/L    Absolute Lymphocytes 3.6 0.8 - 5.3 10e9/L    Absolute Monocytes 0.9 0.0 - 1.3 10e9/L    Absolute Eosinophils 2.8 (H) 0.0 - 0.7 10e9/L    Absolute Basophils 0.1 0.0 - 0.2 10e9/L   Comprehensive metabolic panel   Result Value Ref Range    Sodium 139 133 - 144 mmol/L    Potassium 4.1 3.4 - 5.3 mmol/L    Chloride 102 94 - 109 mmol/L    Carbon Dioxide 29 20 - 32 mmol/L    Anion Gap 8 3 - 14 mmol/L    Glucose 79 70 - 99 mg/dL    Urea Nitrogen 14 7 - 30 mg/dL    Creatinine 0.92 0.66 - 1.25 mg/dL    GFR Estimate 87 >60 mL/min/1.7m2    GFR Estimate If Black >90   GFR Calc   >60 mL/min/1.7m2    Calcium 9.2 8.5 - 10.1 mg/dL    Bilirubin Total 0.4 0.2 - 1.3 mg/dL    Albumin 4.0 3.4 - 5.0 g/dL    Protein Total 7.7 6.8 - 8.8 g/dL    Alkaline Phosphatase 56 40 - 150 U/L    ALT 37 0 - 70 U/L    AST 22 0 - 45 U/L     If you have any questions or concerns please call the clinic at [phone].    Tone Vanessa PA-C/mp

## 2018-03-15 ENCOUNTER — OFFICE VISIT (OUTPATIENT)
Dept: FAMILY MEDICINE | Facility: CLINIC | Age: 48
End: 2018-03-15
Payer: COMMERCIAL

## 2018-03-15 VITALS
SYSTOLIC BLOOD PRESSURE: 135 MMHG | HEIGHT: 65 IN | OXYGEN SATURATION: 97 % | TEMPERATURE: 98.2 F | HEART RATE: 79 BPM | DIASTOLIC BLOOD PRESSURE: 89 MMHG | BODY MASS INDEX: 26.73 KG/M2 | WEIGHT: 160.4 LBS

## 2018-03-15 DIAGNOSIS — J45.41 MODERATE PERSISTENT ASTHMA WITH EXACERBATION: ICD-10-CM

## 2018-03-15 DIAGNOSIS — M25.512 ACUTE PAIN OF LEFT SHOULDER: ICD-10-CM

## 2018-03-15 DIAGNOSIS — R07.9 ACUTE CHEST PAIN: Primary | ICD-10-CM

## 2018-03-15 DIAGNOSIS — Z13.220 SCREENING FOR LIPOID DISORDERS: ICD-10-CM

## 2018-03-15 DIAGNOSIS — M54.2 NECK PAIN: ICD-10-CM

## 2018-03-15 LAB
CHOLEST SERPL-MCNC: 202 MG/DL
HDLC SERPL-MCNC: 61 MG/DL
LDLC SERPL CALC-MCNC: 115 MG/DL
NONHDLC SERPL-MCNC: 141 MG/DL
TRIGL SERPL-MCNC: 132 MG/DL

## 2018-03-15 PROCEDURE — 36415 COLL VENOUS BLD VENIPUNCTURE: CPT | Performed by: NURSE PRACTITIONER

## 2018-03-15 PROCEDURE — 80061 LIPID PANEL: CPT | Performed by: NURSE PRACTITIONER

## 2018-03-15 PROCEDURE — 99214 OFFICE O/P EST MOD 30 MIN: CPT | Performed by: NURSE PRACTITIONER

## 2018-03-15 PROCEDURE — 93000 ELECTROCARDIOGRAM COMPLETE: CPT | Performed by: NURSE PRACTITIONER

## 2018-03-15 RX ORDER — PREDNISONE 20 MG/1
40 TABLET ORAL DAILY
Qty: 10 TABLET | Refills: 0 | Status: SHIPPED | OUTPATIENT
Start: 2018-03-15 | End: 2018-03-20

## 2018-03-15 RX ORDER — CYCLOBENZAPRINE HCL 10 MG
10 TABLET ORAL
Qty: 14 TABLET | Refills: 0 | Status: SHIPPED | OUTPATIENT
Start: 2018-03-15 | End: 2018-06-18

## 2018-03-15 RX ORDER — BENZONATATE 100 MG/1
100-200 CAPSULE ORAL 3 TIMES DAILY PRN
Qty: 42 CAPSULE | Refills: 0 | Status: SHIPPED | OUTPATIENT
Start: 2018-03-15 | End: 2018-06-18

## 2018-03-15 RX ORDER — AZITHROMYCIN 250 MG/1
TABLET, FILM COATED ORAL
Qty: 6 TABLET | Refills: 0 | Status: SHIPPED | OUTPATIENT
Start: 2018-03-15 | End: 2018-06-18

## 2018-03-15 RX ORDER — ALBUTEROL SULFATE 90 UG/1
2 AEROSOL, METERED RESPIRATORY (INHALATION) EVERY 6 HOURS PRN
Qty: 3 INHALER | Refills: 0 | Status: SHIPPED | OUTPATIENT
Start: 2018-03-15 | End: 2018-06-18

## 2018-03-15 NOTE — PROGRESS NOTES
Results discussed directly with patient while patient was present. Any further details documented in the note.   Viji Scott NP

## 2018-03-15 NOTE — PROGRESS NOTES
SUBJECTIVE:  Christ Gomes  is a 47 year old  male  who presents with the following concerns;              Symptoms: Present Comment   Fever/Chills     Fatigue     Muscle Aches     Eye Irritation     Sneezing     Nasal Parag/Drg     Sinus Pressure/Pain     Loss of smell     Dental pain     Sore Throat     Swollen Glands     Ear Pain/Fullness x left   Cough x    Wheeze x    Chest Pain     Shortness of breath x    Rash     Other x Neck sore     Symptom duration:  2 months   Sympom severity:  mild   Treatments tried:  mucinex, inhalers   Contacts:  none     Muscle/Joint Pain     Onset: 2 weeks    Description:   Location: left shoulder  Character: Sharp    Progression of Symptoms: worse    Accompanying Signs & Symptoms:  Other symptoms: weakness of hand    Precipitating factors:   Trauma or overuse: no     Alleviating factors:  Improved by: nothing  Therapies Tried and outcome: heat, icyhot            Medications updated and reviewed.  Past, family and surgical history is updated and reviewed in the record.  Patient Active Problem List    Diagnosis Date Noted     Benign essential hypertension 11/22/2016     Priority: Medium     Mild intermittent asthma without complication 03/29/2016     Priority: Medium     History of hepatitis B      Priority: Medium     Hyperlipidemia LDL goal <160 03/20/2015     Priority: Medium     Past Medical History:   Diagnosis Date     History of hepatitis B       Family History   Problem Relation Age of Onset     Hypertension Mother      Hypertension Sister      Hypertension Sister      ROS:  Other than noted above, general, HEENT, respiratory, cardiac and gastrointestinal systems are negative.      OBJECTIVE:  GENERAL:  Alert, no acute distress  EYES:  PERRL, EOM normal, conjunctiva and lids normal  HEENT:  Ears and TMs normal, oral mucosa and posterior oropharynx normal POSITIVE nasal mucosa edematous, erythematous  RESP:  POSITIVE for exp wheeze bilateral, improved with coughing  CV:  Normal  rate, regular rhythm, no murmur or gallop.  : No urinary frequency or dysuria, bladder or kidney problems  LYMPHATICS:  No cervical, supraclavicular adenopathy  MS:  extremities normal, no peripheral edema. Normal ROM of left shoulder and neck POSITIVE neck sore with movement.   NEURO:  Alert, oriented, speech and mentation normal    Assessment/Plan:     ICD-10-CM    1. Acute chest pain R07.9 EKG 12-lead complete w/read - Clinics    now resolved   2. Acute pain of left shoulder M25.512 EKG 12-lead complete w/read - Clinics     cyclobenzaprine (FLEXERIL) 10 MG tablet    now resolved   3. Screening for lipoid disorders Z13.220 Lipid panel reflex to direct LDL Non-fasting   4. Moderate persistent asthma with exacerbation J45.41 azithromycin (ZITHROMAX) 250 MG tablet     predniSONE (DELTASONE) 20 MG tablet     albuterol (PROAIR HFA/PROVENTIL HFA/VENTOLIN HFA) 108 (90 BASE) MCG/ACT Inhaler     benzonatate (TESSALON) 100 MG capsule    x2 weeks   5. Neck pain M54.2 cyclobenzaprine (FLEXERIL) 10 MG tablet        See patient instructions: Stop cold medication.  Take full course of antibiotics and steroid.  Follow up if symptoms persist or worsen.     Viji Scott, MARLYN, FNP-BC

## 2018-03-15 NOTE — MR AVS SNAPSHOT
After Visit Summary   3/15/2018    Christ Gomes    MRN: [id number]           Patient Information     Date Of Birth          1970        Visit Information        Provider Department      3/15/2018 4:00 PM Viji Scott NP [facility]        Today's Diagnoses     Acute pain of left shoulder    -  1    Acute chest pain        Screening for lipoid disorders        Moderate persistent asthma with exacerbation        Neck pain          Care Instructions    Stop cold medication.  Take full course of antibiotics and steroid.  Follow up if symptoms persist or worsen.       Controlling Your Asthma  You can do a lot to manage your asthma and improve your quality of life. You will need to work with your healthcare provider to develop a plan. But it s up to you to put this plan into action.  Why you need to take control  You need to control the inflammation in your lungs. Take all medicine as directed, especially controller medicines, even if you feel that your asthma is under good control. You also need to relieve symptoms when you have them. These are long-term tasks. But the more you stay in control, the better you ll feel. If you don t stay in control:    Asthma symptoms may cause you to miss school, work, or activities that you enjoy.    Asthma flare-ups can be dangerous, even deadly.    Uncontrolled asthma makes it more likely that you will need emergency department and in-hospital care.    Uncontrolled asthma may cause permanent damage to your lungs.    Peak flow monitoring helps measure how open your airways are.   Taking medicine helps you control your asthma and relieve symptoms when they occur.     Using an Asthma Action Plan will help you keep track of and respond to asthma symptoms.   Avoiding triggers--the things that inflame your airways--will help prevent symptoms and flare-ups.   Your action plan  Your healthcare provider will help you prepare, and when needed, update your personal  Asthma Action Plan. Your plan tells you what to do based on your current symptoms. If you don't have an Asthma Action Plan, or if yours isn't up-to-date, make sure you talk with your healthcare provider.  Date Last Reviewed: 1/1/2017 2000-2017 [employer]. [address] [place] [address], [place] [address]. All rights reserved. This information is not intended as a substitute for professional medical care. Always follow your healthcare professional's instructions.        Shoulder Problems  Arthritis, injury, bone disease, and torn muscles and tendons can cause pain, stiffness, and sometimes swelling in your shoulder. Then even simple movements become painful and difficult.    Osteoarthritis  Osteoarthritis is a wearing away of your joint. Your cartilage becomes cracked and pitted, and your socket may wear down. Eventually, your bone is exposed and may develop growths called spurs. Without a cushion of cartilage, your joint becomes stiff and painful. It may feel as if it s grinding or slipping out of place when you move your arm.    Inflammatory (rheumatoid) arthritis  Inflammatory arthritis is a chronic joint disease. Your synovium (the membrane that lines your joints) thickens. It then forms a tissue growth (pannus) that clings to your cartilage and releases chemicals that destroy it. Your joint may become red, swollen, and warm. Pain may radiate into your neck and arm. Over time, your joint may get stiff and your muscles may weaken from disuse. Your bone may also be destroyed.     Fracture  A fracture can occur when you fall on an outstretched hand or elbow. The ball and/or tuberosities can break off, leaving your arm bone in pieces. A fractured shoulder is painful and may be black and blue and look deformed.    Avascular necrosis  A number of conditions, including long-term use of steroids or alcohol, can cause the blood supply to your bone to be cut off. As the bone dies, it collapses. Your shoulder  becomes painful and movement is limited.    Rotator cuff tear  A chronic rotator cuff tear may lead to severe arthritis. As the ball rides up against your acromion, your joint becomes painful, stiff, and weak. Surgery can relieve the pain, but you may never regain flexibility and strength.  Date Last Reviewed: 9/26/2015 2000-2017 [employer]. [address] [place] [address]. All rights reserved. This information is not intended as a substitute for professional medical care. Always follow your healthcare professional's instructions.         *CHEST PAIN, UNCERTAIN CAUSE    Based on your exam today, the exact cause of your chest pain is not certain. Your condition does not seem serious at this time, and your pain does not appear to be coming from your heart. However, sometimes the signs of a serious problem take more time to appear. Therefore, watch for the warning signs listed below.  HOME CARE:  1. Rest today and avoid strenuous activity.  2. Take any prescribed medicine as directed.  FOLLOW UP with your doctor in 1-3 days.   GET PROMPT MEDICAL ATTENTION if any of the following occur:    A change in the type of pain: if it feels different, becomes more severe, lasts longer, or begins to spread into your shoulder, arm, neck, jaw or back    Shortness of breath or increased pain with breathing    Weakness, dizziness, or fainting    Cough with blood or dark colored sputum (phlegm)    Fever over 101  F (38.3  C)    Swelling, pain or redness in one leg    3341-9651 [employer]. [address] [place] [address]. All rights reserved. This information is not intended as a substitute for professional medical care. Always follow your healthcare professional's instructions.  This information has been modified by your health care provider with permission from the publisher.            Follow-ups after your visit        Follow-up notes from your care team     Return if symptoms  "worsen or fail to improve.      Who to contact     Normal or non-critical lab and imaging results will be communicated to you by MyChart, letter or phone within 4 business days after the clinic has received the results. If you do not hear from us within 7 days, please contact the clinic through MyChart or phone. If you have a critical or abnormal lab result, we will notify you by phone as soon as possible.  Submit refill requests through [employer] or call your pharmacy and they will forward the refill request to us. Please allow 3 business days for your refill to be completed.          If you need to speak with a  for additional information , please call: [phone]             Additional Information About Your Visit        Care EveryWhere ID     This is your Care EveryWhere ID. This could be used by other organizations to access your [place] medical records  [id number]        Your Vitals Were     Pulse Temperature Height Pulse Oximetry BMI (Body Mass Index)       79 98.2  F (36.8  C) (Oral) 5' 4.57\" (1.64 m) 97% 27.05 kg/m2        Blood Pressure from Last 3 Encounters:   03/15/18 (!) 150/95   05/30/17 117/73   04/18/17 136/83    Weight from Last 3 Encounters:   03/15/18 160 lb 6.4 oz (72.8 kg)   05/30/17 156 lb 3.2 oz (70.9 kg)   04/18/17 156 lb 3.2 oz (70.9 kg)              We Performed the Following     Asthma Action Plan (AAP)     EKG 12-lead complete w/read - Clinics     Lipid panel reflex to direct LDL Non-fasting          Today's Medication Changes          These changes are accurate as of 3/15/18  4:18 PM.  If you have any questions, ask your nurse or doctor.               Start taking these medicines.        Dose/Directions    azithromycin 250 MG tablet   Commonly known as:  ZITHROMAX   Used for:  Moderate persistent asthma with exacerbation   Started by:  Viji Scott NP        Two tablets first day, then one tablet daily for four days.   Quantity:  6 tablet   Refills:  0       " benzonatate 100 MG capsule   Commonly known as:  TESSALON   Used for:  Moderate persistent asthma with exacerbation   Started by:  Viji Scott NP        Dose:  100-200 mg   Take 1-2 capsules (100-200 mg) by mouth 3 times daily as needed for cough (Keep out of reach of children)   Quantity:  42 capsule   Refills:  0       cyclobenzaprine 10 MG tablet   Commonly known as:  FLEXERIL   Used for:  Acute pain of left shoulder, Neck pain   Started by:  Viji Scott NP        Dose:  10 mg   Take 1 tablet (10 mg) by mouth nightly as needed for muscle spasms   Quantity:  14 tablet   Refills:  0       predniSONE 20 MG tablet   Commonly known as:  DELTASONE   Used for:  Moderate persistent asthma with exacerbation   Started by:  Viji Scott NP        Dose:  40 mg   Take 2 tablets (40 mg) by mouth daily for 5 days   Quantity:  10 tablet   Refills:  0         These medicines have changed or have updated prescriptions.        Dose/Directions    * PROAIR  (90 BASE) MCG/ACT Inhaler   This may have changed:  Another medication with the same name was added. Make sure you understand how and when to take each.   Generic drug:  albuterol   Changed by:  Viji Scott NP        INHALE 2 PUFFS INTO THE LUNGS Q 6 H PRF SOB   Refills:  1       * albuterol 108 (90 BASE) MCG/ACT Inhaler   Commonly known as:  PROAIR HFA/PROVENTIL HFA/VENTOLIN HFA   This may have changed:  You were already taking a medication with the same name, and this prescription was added. Make sure you understand how and when to take each.   Used for:  Moderate persistent asthma with exacerbation   Changed by:  Viji Scott NP        Dose:  2 puff   Inhale 2 puffs into the lungs every 6 hours as needed for shortness of breath / dyspnea or wheezing   Quantity:  3 Inhaler   Refills:  0       * Notice:  This list has 2 medication(s) that are the same as other medications prescribed for you. Read the directions carefully, and ask your doctor or other  care provider to review them with you.         Where to get your medicines      [id number] [place], [place] [facility] [address] [facility] [address] [address]     Phone:  [phone]     albuterol 108 (90 BASE) MCG/ACT Inhaler    azithromycin 250 MG tablet    benzonatate 100 MG capsule    cyclobenzaprine 10 MG tablet    predniSONE 20 MG tablet                Primary Care Provider Office Phone # Fax #    Tone Vanessa PA-C [phone] [address]  [place] [address]        Equal Access to Services     YANICK BUSTOS : Hadii aad ku hadasho Soomaali, waaxda luqadaha, qaybta kaalmada adeegyada, waxay idiin hayrachelln ivet francis . So [facility] [phone].    ATENCIÓN: Si habla español, tiene a garduno disposición servicios gratuitos de asistencia lingüística. [facility] [phone].    We comply with applicable federal civil rights laws and [place] laws. We do not discriminate on the basis of race, color, national origin, age, disability, sex, sexual orientation, or gender identity.            Thank you!     Thank you for choosing [facility]  for your care. Our goal is always to provide you with excellent care. Hearing back from our patients is one way we can continue to improve our services. Please take a few minutes to complete the written survey that you may receive in the mail after your visit with us. Thank you!             Your Updated Medication List - Protect others around you: Learn how to safely use, store and throw away your medicines at www.disposemymeds.org.          This list is accurate as of 3/15/18  4:18 PM.  Always use your most recent med list.                   Brand Name Dispense Instructions for use Diagnosis    azithromycin 250 MG tablet    ZITHROMAX    6 tablet    Two tablets first day, then one tablet daily for four days.    Moderate persistent asthma with  exacerbation       benzonatate 100 MG capsule    TESSALON    42 capsule    Take 1-2 capsules (100-200 mg) by mouth 3 times daily as needed for cough (Keep out of reach of children)    Moderate persistent asthma with exacerbation       cyclobenzaprine 10 MG tablet    FLEXERIL    14 tablet    Take 1 tablet (10 mg) by mouth nightly as needed for muscle spasms    Acute pain of left shoulder, Neck pain       mometasone-formoterol 100-5 MCG/ACT oral inhaler    DULERA    39 g    Inhale 2 puffs into the lungs 2 times daily    Mild intermittent asthma without complication       predniSONE 20 MG tablet    DELTASONE    10 tablet    Take 2 tablets (40 mg) by mouth daily for 5 days    Moderate persistent asthma with exacerbation       * PROAIR  (90 BASE) MCG/ACT Inhaler   Generic drug:  albuterol      INHALE 2 PUFFS INTO THE LUNGS Q 6 H PRF SOB        * albuterol 108 (90 BASE) MCG/ACT Inhaler    PROAIR HFA/PROVENTIL HFA/VENTOLIN HFA    3 Inhaler    Inhale 2 puffs into the lungs every 6 hours as needed for shortness of breath / dyspnea or wheezing    Moderate persistent asthma with exacerbation       * Notice:  This list has 2 medication(s) that are the same as other medications prescribed for you. Read the directions carefully, and ask your doctor or other care provider to review them with you.

## 2018-03-15 NOTE — PATIENT INSTRUCTIONS
Stop cold medication.  Take full course of antibiotics and steroid.  Follow up if symptoms persist or worsen.       Controlling Your Asthma  You can do a lot to manage your asthma and improve your quality of life. You will need to work with your healthcare provider to develop a plan. But it s up to you to put this plan into action.  Why you need to take control  You need to control the inflammation in your lungs. Take all medicine as directed, especially controller medicines, even if you feel that your asthma is under good control. You also need to relieve symptoms when you have them. These are long-term tasks. But the more you stay in control, the better you ll feel. If you don t stay in control:    Asthma symptoms may cause you to miss school, work, or activities that you enjoy.    Asthma flare-ups can be dangerous, even deadly.    Uncontrolled asthma makes it more likely that you will need emergency department and in-hospital care.    Uncontrolled asthma may cause permanent damage to your lungs.    Peak flow monitoring helps measure how open your airways are.   Taking medicine helps you control your asthma and relieve symptoms when they occur.     Using an Asthma Action Plan will help you keep track of and respond to asthma symptoms.   Avoiding triggers--the things that inflame your airways--will help prevent symptoms and flare-ups.   Your action plan  Your healthcare provider will help you prepare, and when needed, update your personal Asthma Action Plan. Your plan tells you what to do based on your current symptoms. If you don't have an Asthma Action Plan, or if yours isn't up-to-date, make sure you talk with your healthcare provider.  Date Last Reviewed: 1/1/2017 2000-2017 [employer]. [address] [place] [address], [place] [address]. All rights reserved. This information is not intended as a substitute for professional medical care. Always follow your healthcare professional's  instructions.        Shoulder Problems  Arthritis, injury, bone disease, and torn muscles and tendons can cause pain, stiffness, and sometimes swelling in your shoulder. Then even simple movements become painful and difficult.    Osteoarthritis  Osteoarthritis is a wearing away of your joint. Your cartilage becomes cracked and pitted, and your socket may wear down. Eventually, your bone is exposed and may develop growths called spurs. Without a cushion of cartilage, your joint becomes stiff and painful. It may feel as if it s grinding or slipping out of place when you move your arm.    Inflammatory (rheumatoid) arthritis  Inflammatory arthritis is a chronic joint disease. Your synovium (the membrane that lines your joints) thickens. It then forms a tissue growth (pannus) that clings to your cartilage and releases chemicals that destroy it. Your joint may become red, swollen, and warm. Pain may radiate into your neck and arm. Over time, your joint may get stiff and your muscles may weaken from disuse. Your bone may also be destroyed.     Fracture  A fracture can occur when you fall on an outstretched hand or elbow. The ball and/or tuberosities can break off, leaving your arm bone in pieces. A fractured shoulder is painful and may be black and blue and look deformed.    Avascular necrosis  A number of conditions, including long-term use of steroids or alcohol, can cause the blood supply to your bone to be cut off. As the bone dies, it collapses. Your shoulder becomes painful and movement is limited.    Rotator cuff tear  A chronic rotator cuff tear may lead to severe arthritis. As the ball rides up against your acromion, your joint becomes painful, stiff, and weak. Surgery can relieve the pain, but you may never regain flexibility and strength.  Date Last Reviewed: 9/26/2015 2000-2017 [employer]. [address] [place] [address], [place] [address]. All rights reserved. This information is not intended as a  substitute for professional medical care. Always follow your healthcare professional's instructions.         *CHEST PAIN, UNCERTAIN CAUSE    Based on your exam today, the exact cause of your chest pain is not certain. Your condition does not seem serious at this time, and your pain does not appear to be coming from your heart. However, sometimes the signs of a serious problem take more time to appear. Therefore, watch for the warning signs listed below.  HOME CARE:  1. Rest today and avoid strenuous activity.  2. Take any prescribed medicine as directed.  FOLLOW UP with your doctor in 1-3 days.   GET PROMPT MEDICAL ATTENTION if any of the following occur:    A change in the type of pain: if it feels different, becomes more severe, lasts longer, or begins to spread into your shoulder, arm, neck, jaw or back    Shortness of breath or increased pain with breathing    Weakness, dizziness, or fainting    Cough with blood or dark colored sputum (phlegm)    Fever over 101  F (38.3  C)    Swelling, pain or redness in one leg    5983-6676 [employer]. [address] [place], [place] [address]. All rights reserved. This information is not intended as a substitute for professional medical care. Always follow your healthcare professional's instructions.  This information has been modified by your health care provider with permission from the publisher.

## 2018-03-15 NOTE — LETTER
March 19, 2018      Christ Gomes  [id number] [facility] [id number]        Dear ,    We are writing to inform you of your test results.    Normal labs results.  Please try to incorporate some of the below tips to help improve your cholesterol, we can repeat fasting labs in one year.     Controlling Cholesterol   What is cholesterol?   Cholesterol is a fatty substance. It has both good and bad effects on the body. Your body needs small amounts of cholesterol to make hormones and to build and maintain cells. However, when your body has too much cholesterol, deposits of fat called plaque form inside the walls of your blood vessels (arteries). The blood vessel walls thicken and the vessels become narrower. This is a condition called atherosclerosis. These changes make it harder for blood to flow through the blood vessels, increasing your risk of heart disease, heart attack, and stroke. The plaque can also easily break off and cause a blockage. When the artery is blocked, no blood can flow through it. This prevents the heart muscle from getting oxygen and can cause a heart attack. If a piece of plaque breaks off and flows to the brain, it can cause a stroke.   Most of the cholesterol in your blood is made by your liver from the fats, carbohydrates, and proteins you eat. You also get cholesterol by eating animal products such as meat, eggs, and high-fat dairy products such as whole milk, cream, and real butter.   It is important to find out what your cholesterol numbers are because lowering cholesterol levels that are too high lessens your risk for developing heart disease. It reduces the chance of a heart attack or death from heart disease, even if you already have heart disease.   How is cholesterol measured?   When you get your cholesterol checked, your healthcare provider will give you a number for your total cholesterol level. You can use the chart below to see if your total cholesterol is high.     Total  "Cholesterol Level (mg/dL)   ----------------------------------------   less than 200   good   200 to 239      borderline high   240 or above    high   ----------------------------------------     When your total cholesterol is measured and found to be high, your healthcare provider may also check the amount of LDL (low-density lipoprotein) and HDL (high-density lipoprotein) in your blood. LDL and HDL carry cholesterol through your blood. LDL carries a lot of cholesterol, leaves behind fatty deposits on your artery walls, and contributes to heart disease. HDL does the opposite. HDL cleans the artery walls and removes extra cholesterol from the body, thus lowering the risk of heart disease. LDL cholesterol is called bad cholesterol. (You can think of \"L\" for \"lousy\" cholesterol.) HDL cholesterol is called good cholesterol (think of \"H\" for \"healthy\" cholesterol). It is good to have low levels of LDL and high levels of HDL.   Because HDL cholesterol protects against heart disease, higher numbers are better. HDL levels of 60 mg/dL or more help to lower your risk for heart disease. A level equal to or less than 40 mg/dL is low and is considered a major risk factor because it increases your risk for developing heart disease   The level of LDL cholesterol that is healthy for you depends on your risk of heart disease and heart attack. In general, the higher your LDL level and the more risk factors you have for heart disease, the greater your chances of developing heart disease or having a heart attack. These are the recommended goals for LDL, according to risk level:   The goal is less than 160 mg/dL if your risk of heart disease is low.   The goal is less than 130 mg/dL if you have a moderate risk.   The goal is less than 100 mg/dL if you have a high risk of heart disease or you already have heart disease or diabetes.   For many people with heart disease, especially if they also have diabetes, the goal is less than 70 " mg/dL.   Lowering cholesterol, especially the LDL, is connected or linked with:   Slowing, stopping, or even reversing the buildup of plaque   Reducing the chances of heart attack by making plaques more stable and less likely to break off or rupture.   This means the chance of having a heart attack is much less.   In addition to high levels of total cholesterol and LDL, major risks for heart disease include:   diabetes   cigarette smoking   high blood pressure (140/90 mm Hg or higher or you are taking blood pressure medicine)   low HDL cholesterol (less than 40 mg/dL)   family history of early heart disease (father or brother diagnosed with heart disease before age 55, or mother or sister diagnosed before age 65)   age 45 or older for men and age 55 or older for women.   If you have diabetes, your risk of heart disease is high. If you do not have diabetes but you have 2 or more of the other risk factors in this list, your risk is moderate to high. Based on your personal and family history, your healthcare provider can help you calculate your risk level.   How can I control my cholesterol level?   High cholesterol may run in families. Know your family history and discuss it with your healthcare provider.   You can often control cholesterol levels by   eating right   exercising   not smoking   losing weight if you are overweight.   If you have a high risk for heart disease, your healthcare provider may prescribe cholesterol-lowering medicine as well as changes in lifestyle.   Eat right.   Follow these diet guidelines to help control your cholesterol:   Limit the cholesterol in your diet to less than 300 mg per day. If you have heart disease, limit cholesterol to less than 200 mg per day.   Be careful about the amounts and types of fat that you eat. Fats should contribute no more than 25 to 35% of your daily calories. Most of your dietary fat should be from polyunsaturated and monounsaturated fats, which are healthier  "than saturated fat and trans fats.   Saturated fat raises your blood cholesterol because it makes it hard for the body to clear the cholesterol away. Less than 7 to 10% of your calories should come from saturated fat. Saturated fat is found in different amounts in almost all foods. Butter, some oils, meat, and poultry fat contain a lot of saturated fat.   Trans fatty acids, often called trans fats, tend to raise your bad LDL cholesterol and lower your good HDL cholesterol. Trans fats naturally occur in some foods, mostly in meat and dairy products. But food makers can create trans fats when they are preparing food for grocery stores. This is usually done by adding hydrogen to fats. If the list of ingredients of a food product includes the words \"partially hydrogenated\" (usually referring to oils, such as soybean oil and others), the product is likely to contain trans fats. Try to eat as little trans fat as possible. As of January 2006, nutrition labels must list trans fats if the food contains them. Check the nutrition bar on the side of the package.   Polyunsaturated fats are found in fish and some vegetable oils. Monounsaturated fats are found in olive oil, canola oil, and avocados. Both types of these healthier fats are also found in many nuts and legumes.   Adjust the amount of calories you eat and exercise regularly, according to your healthcare provider's exercise prescription, to maintain your recommended body weight.   To control the cholesterol and types and amounts of fat you eat:   Check food labels for fat and cholesterol content. Choose the foods with less fat per serving.   Limit the amount of butter and margarine you eat.   Use olive, canola, sunflower, safflower, soybean, or corn oil. Avoid tropical oils such as palm or coconut oil. Also avoid oils that have been hydrogenated or partially hydrogenated.   Use salad dressings and margarine made with polyunsaturated and monounsaturated fats.   Use egg " whites or egg substitutes rather than whole eggs.   Replace whole-milk dairy products with nonfat or low-fat milk, cheese, spreads, and yogurt.   Eat skinless chicken, turkey, fish, and meatless entrees more often than red meat.   Choose lean cuts of meat and trim off all visible fat. Keep portion sizes moderate.   Avoid fatty desserts such as ice cream, cream-filled cakes, and cheesecakes. Choose fresh fruits, nonfat frozen yogurt, Popsicles, etc.   Reduce the amount of fried foods, vending machine food, and fast food you eat.   Eat several daily servings of fruits and vegetables (especially fresh fruits and leafy vegetables), beans, and whole grains (such as whole wheat, bran, brown rice, oats, and oatmeal). The fiber in these foods helps lower cholesterol.   Eat 4 to 5 servings of nuts a week. Examples of nuts that can be a part of a healthy diet are walnuts, almonds, hazelnuts, peanuts, pecans, and pistachio nuts.   Look for low-fat or nonfat varieties of the foods you like to eat, or look for substitutes.   Exercise.   Exercise goes hand-in-hand with a healthy diet for controlling cholesterol. Exercise helps because it:   Keeps your weight down.   Decreases your total cholesterol level.   Decreases your LDL (bad cholesterol).   Increases your HDL (good cholesterol).   A good exercise program includes aerobic exercise. Aerobic exercise is any activity that keeps your heart rate up (such as swimming, jogging, walking, and bicycling). You should get at least 30 minutes of moderate aerobic exercise most days of the week. Moderate aerobic exercise is generally defined as requiring the energy it takes to walk 2 miles in 30 minutes. You may need to exercise 60 minutes a day to prevent weight gain and 90 minutes a day to lose weight.   If you haven't been exercising, ask your healthcare provider for an exercise prescription and start your new exercise program slowly.   Don't smoke.   Do not smoke. Smoking increases  "your risk of heart disease because it lowers HDL levels, increases your risk of blood clots, and decreases oxygen to the tissues.   Lose excess weight.   Extra weight increases your risk for heart and blood vessel disease. One way it does this is by causing your LDL (\"bad\") cholesterol to go up. Extra weight can also make you tired. It takes a lot of energy to carry all those pounds around. The result is that you are less active. This can mean that you don't get enough exercise and gain even more weight.   Losing excess weight:   Improves not only the bad LDL cholesterol but other blood fats as well.   Lowers your risk for heart attack or stroke.   Increases your energy and helps you feel better (both physically and mentally) and become more active.   Your weight is primarily the result of 2 factors. One is the number of calories you consume. The other is the number of calories you \"burn\". If you eat more calories than you use, your body will store the extra calories as fat and your weight will go up. If your body uses more calories than you eat, you will lose weight.   Here are some things you can do to lose weight.   Talk to your healthcare provider about your weight. Ask how a change in diet and exercise will change your cholesterol levels. Plan for gradual weight loss, just 1 or 2 pounds per week   Eat fewer calories.   Get more physical activity.   Keep a diary of your food and exercise for a couple of weeks to become more aware of your habits.   In summary, changes that you can make in your lifestyle to control your cholesterol level are:   Eat healthy.   Get regular exercise.   Don't smoke.   Keep a healthy weight.   Have your cholesterol levels checked as often as your provider recommends.   Cholesterol in the Diet   Cholesterol is a fatty substance in your body and in foods made from animals. There is a lot of it in meat, including beef, pork, chicken, and turkey. Whole-milk dairy products, egg yolks, and " shellfish also have a lot of cholesterol.   Your body needs cholesterol to make hormones and build nerve cells. You don't have to get it from food because your body makes cholesterol. If you eat too many foods high in cholesterol or saturated fat you can get too much cholesterol. It can cause high levels of cholesterol in the blood. High cholesterol increases your risk for heart disease.   How are saturated fat and trans fats related to cholesterol levels?   Like cholesterol, saturated fats are found mostly in animal products. Limiting the saturated fat in your diet is just as important as limiting cholesterol because your body makes more cholesterol when you eat saturated fat. Trans fats are another type of fat in animal products. Trans fats are also in many processed foods, such as cakes, cookies and potato chips. Like saturated fat, trans fats raise cholesterol levels in the blood.   How much cholesterol do animal products have?   As the table below shows, some foods have more cholesterol and saturated fat than others. They may be high in both, or low in both, or high in one but not the other. The healthiest diets include mostly foods that are low in cholesterol, saturated, and trans fat.   Most foods in the meat group have about the same amount of cholesterol per serving, regardless of the type or cut of meat. However, the amount of saturated fat in these various meats can be very different. High-fat cuts, such as prime rib and dark-meat poultry with the skin, contain a lot more saturated fat than lean cuts, such as pork tenderloin and chicken breast without skin.   Whole-milk dairy products, such as whole milk, cheese, ice cream, sour cream, and butter, have a lot of cholesterol and saturated fat. The good news is that food producers can remove both cholesterol and saturated fat from dairy foods. When dairy is skimmed of its fats, the cholesterol is skimmed off along with it. Skim (nonfat) dairy products are a  healthy food choice.   Shellfish are low in saturated fat. Some shellfish are high in cholesterol, but the saturated fat is so low that these foods are still healthy. Fin fish, such as salmon, tuna, trout, and halibut, are relatively low in cholesterol and saturated fat.   Cholesterol and Saturated Fat Content of Selected Foods     Food                                 Fat             Cholesterol                                       (grams)         (milligrams)   --------------------------------------------------------------------   8 ounces (oz) whole milk             4.5 g               25 mg   8 ounces skim milk                   0.36 g               5 mg   1 tablespoon butter                  7 g                 30 mg   4 tablespoon sour cream              5.5 g               24 mg   3 oz. pork tenderloin                2 g                 65 mg   3 oz. pork sausage                   7.5 g               70 mg   3 oz sirloin steak                   3 g                 76 mg   3 oz beef ribs                       5 g                 69 mg   3 oz chicken breast without skin     1 g                 73 mg   3 oz chicken thigh with skin         3.7 g               79 mg   3 oz shrimp                          0.25               166 mg   3 oz salmon                          1.5                 50 mg   1/2 cup vegetable shortening        25.5 g                0 mg   ---------------------------------------------------------------------     How much cholesterol can I have in my diet?   The guidelines for cholesterol in the diet depend on your medical condition. The recommendations are:   less than 200 mg a day if you have high cholesterol or heart disease   less than 300 mg of cholesterol a day if you do not have high cholesterol or heart disease.   Everyone should try to avoid saturated and trans fats.   Limiting cholesterol, saturated fat, and trans fat is easy if you get in the habit of cooking lean. Choose the leanest  cuts of meats and dairy products, including more fish and less processed food. Some plant foods, such as palm oil, coconut oil, and cocoa butter do contain saturated fat, but it is not known if these fats have the same harmful effect on the heart as the saturated fat in animal products. Plant foods, such as grains, fruits, vegetables, vegetable oils, nuts, and seeds, do not contain any cholesterol.     Published by [employer].   This content is reviewed periodically and is subject to change as new health information becomes available. The information is intended to inform and educate and is not a replacement for medical evaluation, advice, diagnosis or treatment by a healthcare professional.   Yuni Paulson RD, CDE   2010 [employer] [facility] and/or its affiliates. All Rights Reserved.          Resulted Orders   Lipid panel reflex to direct LDL Non-fasting   Result Value Ref Range    Cholesterol 202 (H) <200 mg/dL      Comment:      Desirable:       <200 mg/dl    Triglycerides 132 <150 mg/dL      Comment:      Non Fasting    HDL Cholesterol 61 >39 mg/dL    LDL Cholesterol Calculated 115 (H) <100 mg/dL      Comment:      Above desirable:  100-129 mg/dl  Borderline High:  130-159 mg/dL  High:             160-189 mg/dL  Very high:       >189 mg/dl      Non HDL Cholesterol 141 (H) <130 mg/dL      Comment:      Above Desirable:  130-159 mg/dl  Borderline high:  160-189 mg/dl  High:             190-219 mg/dl  Very high:       >219 mg/dl         If you have any questions or concerns, please call the clinic at the number listed above.       Sincerely,    Viji Scott NP/aashish

## 2018-03-16 ASSESSMENT — ASTHMA QUESTIONNAIRES: ACT_TOTALSCORE: 20

## 2018-03-19 NOTE — PROGRESS NOTES
Please send a letter to the patient with the results. Normal labs results.  Please try to incorporate some of the below tips to help improve your cholesterol, we can repeat fasting labs in one year.     MOLLY Reddy                     Controlling Cholesterol  What is cholesterol?   Cholesterol is a fatty substance. It has both good and bad effects on the body. Your body needs small amounts of cholesterol to make hormones and to build and maintain cells. However, when your body has too much cholesterol, deposits of fat called plaque form inside the walls of your blood vessels (arteries). The blood vessel walls thicken and the vessels become narrower. This is a condition called atherosclerosis. These changes make it harder for blood to flow through the blood vessels, increasing your risk of heart disease, heart attack, and stroke. The plaque can also easily break off and cause a blockage. When the artery is blocked, no blood can flow through it. This prevents the heart muscle from getting oxygen and can cause a heart attack. If a piece of plaque breaks off and flows to the brain, it can cause a stroke.   Most of the cholesterol in your blood is made by your liver from the fats, carbohydrates, and proteins you eat. You also get cholesterol by eating animal products such as meat, eggs, and high-fat dairy products such as whole milk, cream, and real butter.   It is important to find out what your cholesterol numbers are because lowering cholesterol levels that are too high lessens your risk for developing heart disease. It reduces the chance of a heart attack or death from heart disease, even if you already have heart disease.   How is cholesterol measured?   When you get your cholesterol checked, your healthcare provider will give you a number for your total cholesterol level. You can use the chart below to see if your total cholesterol is high.     Total Cholesterol Level  "(mg/dL)  ----------------------------------------  less than 200   good  200 to 239      borderline high  240 or above    high  ----------------------------------------    When your total cholesterol is measured and found to be high, your healthcare provider may also check the amount of LDL (low-density lipoprotein) and HDL (high-density lipoprotein) in your blood. LDL and HDL carry cholesterol through your blood. LDL carries a lot of cholesterol, leaves behind fatty deposits on your artery walls, and contributes to heart disease. HDL does the opposite. HDL cleans the artery walls and removes extra cholesterol from the body, thus lowering the risk of heart disease. LDL cholesterol is called bad cholesterol. (You can think of \"L\" for \"lousy\" cholesterol.) HDL cholesterol is called good cholesterol (think of \"H\" for \"healthy\" cholesterol). It is good to have low levels of LDL and high levels of HDL.   Because HDL cholesterol protects against heart disease, higher numbers are better. HDL levels of 60 mg/dL or more help to lower your risk for heart disease. A level equal to or less than 40 mg/dL is low and is considered a major risk factor because it increases your risk for developing heart disease   The level of LDL cholesterol that is healthy for you depends on your risk of heart disease and heart attack. In general, the higher your LDL level and the more risk factors you have for heart disease, the greater your chances of developing heart disease or having a heart attack. These are the recommended goals for LDL, according to risk level:   The goal is less than 160 mg/dL if your risk of heart disease is low.   The goal is less than 130 mg/dL if you have a moderate risk.   The goal is less than 100 mg/dL if you have a high risk of heart disease or you already have heart disease or diabetes.   For many people with heart disease, especially if they also have diabetes, the goal is less than 70 mg/dL.   Lowering " cholesterol, especially the LDL, is connected or linked with:   Slowing, stopping, or even reversing the buildup of plaque   Reducing the chances of heart attack by making plaques more stable and less likely to break off or rupture.   This means the chance of having a heart attack is much less.   In addition to high levels of total cholesterol and LDL, major risks for heart disease include:   diabetes   cigarette smoking   high blood pressure (140/90 mm Hg or higher or you are taking blood pressure medicine)   low HDL cholesterol (less than 40 mg/dL)   family history of early heart disease (father or brother diagnosed with heart disease before age 55, or mother or sister diagnosed before age 65)   age 45 or older for men and age 55 or older for women.   If you have diabetes, your risk of heart disease is high. If you do not have diabetes but you have 2 or more of the other risk factors in this list, your risk is moderate to high. Based on your personal and family history, your healthcare provider can help you calculate your risk level.   How can I control my cholesterol level?   High cholesterol may run in families. Know your family history and discuss it with your healthcare provider.   You can often control cholesterol levels by   eating right   exercising   not smoking   losing weight if you are overweight.   If you have a high risk for heart disease, your healthcare provider may prescribe cholesterol-lowering medicine as well as changes in lifestyle.   Eat right.  Follow these diet guidelines to help control your cholesterol:   Limit the cholesterol in your diet to less than 300 mg per day. If you have heart disease, limit cholesterol to less than 200 mg per day.   Be careful about the amounts and types of fat that you eat. Fats should contribute no more than 25 to 35% of your daily calories. Most of your dietary fat should be from polyunsaturated and monounsaturated fats, which are healthier than saturated fat  "and trans fats.   Saturated fat raises your blood cholesterol because it makes it hard for the body to clear the cholesterol away. Less than 7 to 10% of your calories should come from saturated fat. Saturated fat is found in different amounts in almost all foods. Butter, some oils, meat, and poultry fat contain a lot of saturated fat.   Trans fatty acids, often called trans fats, tend to raise your bad LDL cholesterol and lower your good HDL cholesterol. Trans fats naturally occur in some foods, mostly in meat and dairy products. But food makers can create trans fats when they are preparing food for grocery stores. This is usually done by adding hydrogen to fats. If the list of ingredients of a food product includes the words \"partially hydrogenated\" (usually referring to oils, such as soybean oil and others), the product is likely to contain trans fats. Try to eat as little trans fat as possible. As of January 2006, nutrition labels must list trans fats if the food contains them. Check the nutrition bar on the side of the package.   Polyunsaturated fats are found in fish and some vegetable oils. Monounsaturated fats are found in olive oil, canola oil, and avocados. Both types of these healthier fats are also found in many nuts and legumes.   Adjust the amount of calories you eat and exercise regularly, according to your healthcare provider's exercise prescription, to maintain your recommended body weight.   To control the cholesterol and types and amounts of fat you eat:   Check food labels for fat and cholesterol content. Choose the foods with less fat per serving.   Limit the amount of butter and margarine you eat.   Use olive, canola, sunflower, safflower, soybean, or corn oil. Avoid tropical oils such as palm or coconut oil. Also avoid oils that have been hydrogenated or partially hydrogenated.   Use salad dressings and margarine made with polyunsaturated and monounsaturated fats.   Use egg whites or egg " substitutes rather than whole eggs.   Replace whole-milk dairy products with nonfat or low-fat milk, cheese, spreads, and yogurt.   Eat skinless chicken, turkey, fish, and meatless entrees more often than red meat.   Choose lean cuts of meat and trim off all visible fat. Keep portion sizes moderate.   Avoid fatty desserts such as ice cream, cream-filled cakes, and cheesecakes. Choose fresh fruits, nonfat frozen yogurt, Popsicles, etc.   Reduce the amount of fried foods, vending machine food, and fast food you eat.   Eat several daily servings of fruits and vegetables (especially fresh fruits and leafy vegetables), beans, and whole grains (such as whole wheat, bran, brown rice, oats, and oatmeal). The fiber in these foods helps lower cholesterol.   Eat 4 to 5 servings of nuts a week. Examples of nuts that can be a part of a healthy diet are walnuts, almonds, hazelnuts, peanuts, pecans, and pistachio nuts.   Look for low-fat or nonfat varieties of the foods you like to eat, or look for substitutes.   Exercise.  Exercise goes hand-in-hand with a healthy diet for controlling cholesterol. Exercise helps because it:   Keeps your weight down.   Decreases your total cholesterol level.   Decreases your LDL (bad cholesterol).   Increases your HDL (good cholesterol).   A good exercise program includes aerobic exercise. Aerobic exercise is any activity that keeps your heart rate up (such as swimming, jogging, walking, and bicycling). You should get at least 30 minutes of moderate aerobic exercise most days of the week. Moderate aerobic exercise is generally defined as requiring the energy it takes to walk 2 miles in 30 minutes. You may need to exercise 60 minutes a day to prevent weight gain and 90 minutes a day to lose weight.   If you haven't been exercising, ask your healthcare provider for an exercise prescription and start your new exercise program slowly.   Don't smoke.  Do not smoke. Smoking increases your risk of heart  "disease because it lowers HDL levels, increases your risk of blood clots, and decreases oxygen to the tissues.   Lose excess weight.  Extra weight increases your risk for heart and blood vessel disease. One way it does this is by causing your LDL (\"bad\") cholesterol to go up. Extra weight can also make you tired. It takes a lot of energy to carry all those pounds around. The result is that you are less active. This can mean that you don't get enough exercise and gain even more weight.   Losing excess weight:   Improves not only the bad LDL cholesterol but other blood fats as well.   Lowers your risk for heart attack or stroke.   Increases your energy and helps you feel better (both physically and mentally) and become more active.   Your weight is primarily the result of 2 factors. One is the number of calories you consume. The other is the number of calories you \"burn\". If you eat more calories than you use, your body will store the extra calories as fat and your weight will go up. If your body uses more calories than you eat, you will lose weight.   Here are some things you can do to lose weight.   Talk to your healthcare provider about your weight. Ask how a change in diet and exercise will change your cholesterol levels. Plan for gradual weight loss, just 1 or 2 pounds per week   Eat fewer calories.   Get more physical activity.   Keep a diary of your food and exercise for a couple of weeks to become more aware of your habits.   In summary, changes that you can make in your lifestyle to control your cholesterol level are:   Eat healthy.   Get regular exercise.   Don't smoke.   Keep a healthy weight.   Have your cholesterol levels checked as often as your provider recommends.   Cholesterol in the Diet  Cholesterol is a fatty substance in your body and in foods made from animals. There is a lot of it in meat, including beef, pork, chicken, and turkey. Whole-milk dairy products, egg yolks, and shellfish also have a " lot of cholesterol.   Your body needs cholesterol to make hormones and build nerve cells. You don't have to get it from food because your body makes cholesterol. If you eat too many foods high in cholesterol or saturated fat you can get too much cholesterol. It can cause high levels of cholesterol in the blood. High cholesterol increases your risk for heart disease.   How are saturated fat and trans fats related to cholesterol levels?   Like cholesterol, saturated fats are found mostly in animal products. Limiting the saturated fat in your diet is just as important as limiting cholesterol because your body makes more cholesterol when you eat saturated fat. Trans fats are another type of fat in animal products. Trans fats are also in many processed foods, such as cakes, cookies and potato chips. Like saturated fat, trans fats raise cholesterol levels in the blood.   How much cholesterol do animal products have?   As the table below shows, some foods have more cholesterol and saturated fat than others. They may be high in both, or low in both, or high in one but not the other. The healthiest diets include mostly foods that are low in cholesterol, saturated, and trans fat.   Most foods in the meat group have about the same amount of cholesterol per serving, regardless of the type or cut of meat. However, the amount of saturated fat in these various meats can be very different. High-fat cuts, such as prime rib and dark-meat poultry with the skin, contain a lot more saturated fat than lean cuts, such as pork tenderloin and chicken breast without skin.   Whole-milk dairy products, such as whole milk, cheese, ice cream, sour cream, and butter, have a lot of cholesterol and saturated fat. The good news is that food producers can remove both cholesterol and saturated fat from dairy foods. When dairy is skimmed of its fats, the cholesterol is skimmed off along with it. Skim (nonfat) dairy products are a healthy food choice.    Shellfish are low in saturated fat. Some shellfish are high in cholesterol, but the saturated fat is so low that these foods are still healthy. Fin fish, such as salmon, tuna, trout, and halibut, are relatively low in cholesterol and saturated fat.   Cholesterol and Saturated Fat Content of Selected Foods     Food                                 Fat             Cholesterol                                      (grams)         (milligrams)  --------------------------------------------------------------------  8 ounces (oz) whole milk             4.5 g               25 mg  8 ounces skim milk                   0.36 g               5 mg  1 tablespoon butter                  7 g                 30 mg  4 tablespoon sour cream              5.5 g               24 mg  3 oz. pork tenderloin                2 g                 65 mg  3 oz. pork sausage                   7.5 g               70 mg  3 oz sirloin steak                   3 g                 76 mg  3 oz beef ribs                       5 g                 69 mg  3 oz chicken breast without skin     1 g                 73 mg  3 oz chicken thigh with skin         3.7 g               79 mg  3 oz shrimp                          0.25               166 mg  3 oz salmon                          1.5                 50 mg  1/2 cup vegetable shortening        25.5 g                0 mg   ---------------------------------------------------------------------    How much cholesterol can I have in my diet?   The guidelines for cholesterol in the diet depend on your medical condition. The recommendations are:   less than 200 mg a day if you have high cholesterol or heart disease   less than 300 mg of cholesterol a day if you do not have high cholesterol or heart disease.   Everyone should try to avoid saturated and trans fats.   Limiting cholesterol, saturated fat, and trans fat is easy if you get in the habit of cooking lean. Choose the leanest cuts of meats and dairy products,  including more fish and less processed food. Some plant foods, such as palm oil, coconut oil, and cocoa butter do contain saturated fat, but it is not known if these fats have the same harmful effect on the heart as the saturated fat in animal products. Plant foods, such as grains, fruits, vegetables, vegetable oils, nuts, and seeds, do not contain any cholesterol.     Published by [employer].  This content is reviewed periodically and is subject to change as new health information becomes available. The information is intended to inform and educate and is not a replacement for medical evaluation, advice, diagnosis or treatment by a healthcare professional.   Yuni Paulson RD, CDE   ? 2010 [facility] and/or its affiliates. All Rights Reserved.

## 2018-06-07 ENCOUNTER — TELEPHONE (OUTPATIENT)
Dept: FAMILY MEDICINE | Facility: CLINIC | Age: 48
End: 2018-06-07

## 2018-06-08 ASSESSMENT — ASTHMA QUESTIONNAIRES: ACT_TOTALSCORE: 8

## 2018-06-18 ENCOUNTER — OFFICE VISIT (OUTPATIENT)
Dept: FAMILY MEDICINE | Facility: CLINIC | Age: 48
End: 2018-06-18
Payer: COMMERCIAL

## 2018-06-18 VITALS
BODY MASS INDEX: 27.31 KG/M2 | HEIGHT: 64 IN | HEART RATE: 92 BPM | OXYGEN SATURATION: 100 % | WEIGHT: 160 LBS | DIASTOLIC BLOOD PRESSURE: 98 MMHG | RESPIRATION RATE: 17 BRPM | SYSTOLIC BLOOD PRESSURE: 152 MMHG

## 2018-06-18 DIAGNOSIS — J45.41 MODERATE PERSISTENT ASTHMA WITH EXACERBATION: ICD-10-CM

## 2018-06-18 DIAGNOSIS — Z87.898 HISTORY OF CHEST PAIN: ICD-10-CM

## 2018-06-18 DIAGNOSIS — I10 BENIGN ESSENTIAL HYPERTENSION: ICD-10-CM

## 2018-06-18 DIAGNOSIS — J30.1 CHRONIC SEASONAL ALLERGIC RHINITIS DUE TO POLLEN: ICD-10-CM

## 2018-06-18 DIAGNOSIS — Z12.11 SPECIAL SCREENING FOR MALIGNANT NEOPLASMS, COLON: Primary | ICD-10-CM

## 2018-06-18 LAB
ANION GAP SERPL CALCULATED.3IONS-SCNC: 11 MMOL/L (ref 3–14)
BUN SERPL-MCNC: 15 MG/DL (ref 7–30)
CALCIUM SERPL-MCNC: 9.1 MG/DL (ref 8.5–10.1)
CHLORIDE SERPL-SCNC: 104 MMOL/L (ref 94–109)
CO2 SERPL-SCNC: 26 MMOL/L (ref 20–32)
CREAT SERPL-MCNC: 0.83 MG/DL (ref 0.66–1.25)
GFR SERPL CREATININE-BSD FRML MDRD: >90 ML/MIN/1.7M2
GLUCOSE SERPL-MCNC: 126 MG/DL (ref 70–99)
POTASSIUM SERPL-SCNC: 3.9 MMOL/L (ref 3.4–5.3)
SODIUM SERPL-SCNC: 141 MMOL/L (ref 133–144)

## 2018-06-18 PROCEDURE — 99214 OFFICE O/P EST MOD 30 MIN: CPT | Performed by: PHYSICIAN ASSISTANT

## 2018-06-18 PROCEDURE — 82785 ASSAY OF IGE: CPT | Performed by: PHYSICIAN ASSISTANT

## 2018-06-18 PROCEDURE — 36415 COLL VENOUS BLD VENIPUNCTURE: CPT | Performed by: PHYSICIAN ASSISTANT

## 2018-06-18 PROCEDURE — 86003 ALLG SPEC IGE CRUDE XTRC EA: CPT | Performed by: PHYSICIAN ASSISTANT

## 2018-06-18 PROCEDURE — 80048 BASIC METABOLIC PNL TOTAL CA: CPT | Performed by: PHYSICIAN ASSISTANT

## 2018-06-18 RX ORDER — LOSARTAN POTASSIUM 50 MG/1
50 TABLET ORAL DAILY
Qty: 30 TABLET | Refills: 1 | Status: SHIPPED | OUTPATIENT
Start: 2018-06-18 | End: 2018-07-26

## 2018-06-18 RX ORDER — ALBUTEROL SULFATE 90 UG/1
2 AEROSOL, METERED RESPIRATORY (INHALATION) EVERY 6 HOURS PRN
Qty: 3 INHALER | Refills: 3 | Status: SHIPPED | OUTPATIENT
Start: 2018-06-18 | End: 2019-04-15

## 2018-06-18 RX ORDER — MONTELUKAST SODIUM 10 MG/1
10 TABLET ORAL AT BEDTIME
Qty: 30 TABLET | Refills: 1 | Status: SHIPPED | OUTPATIENT
Start: 2018-06-18 | End: 2018-07-26

## 2018-06-18 RX ORDER — BUDESONIDE AND FORMOTEROL FUMARATE DIHYDRATE 80; 4.5 UG/1; UG/1
2 AEROSOL RESPIRATORY (INHALATION) 2 TIMES DAILY
Qty: 1 INHALER | Refills: 1 | Status: SHIPPED | OUTPATIENT
Start: 2018-06-18 | End: 2018-07-26

## 2018-06-18 NOTE — MR AVS SNAPSHOT
After Visit Summary   6/18/2018    Christ Gomes    MRN: [id number]           Patient Information     Date Of Birth          1970        Visit Information        Provider Department      6/18/2018 9:15 AM Tone Vanessa PA-C; [facility] [place]        Today's Diagnoses     Special screening for malignant neoplasms, colon    -  1    Moderate persistent asthma with exacerbation        Benign essential hypertension        Chronic seasonal allergic rhinitis due to pollen        History of chest pain           Follow-ups after your visit        Additional Services     GASTROENTEROLOGY ADULT REF PROCEDURE ONLY [facility] [phone]       Last Lab Result: Creatinine (mg/dL)       Date                     Value                 05/30/2017               0.92             ----------  Body mass index is 27.46 kg/(m^2).     Needed:  Yes  Language:  Latvian    Patient will be contacted to schedule procedure.     Please be aware that coverage of these services is subject to the terms and limitations of your health insurance plan.  Call member services at your health plan with any benefit or coverage questions.  Any procedures must be performed at a [place] facility OR coordinated by your clinic's referral office.    Please bring the following with you to your appointment:    (1) Any X-Rays, CTs or MRIs which have been performed.  Contact the facility where they were done to arrange for  prior to your scheduled appointment.    (2) List of current medications   (3) This referral request   (4) Any documents/labs given to you for this referral                  Your next 10 appointments already scheduled     Jul 23, 2018  8:30 AM CDT   NM INJECTION with MGNMINJ1   [facility]    [id number] [place] [id number] [phone]            Jul 23, 2018  9:15 AM CDT   NM SCAN3 with MGNM1   [facility]  [facility] ([facility])    [address] [place] [id number] [phone]            Jul 23, 2018  9:45 AM CDT   Ekg Stress Nm Exercise with MG STRESS RM, MG IMAGING NURSE,  CARD   [facility] ([facility]    [address] [place] [id number] [phone]            Jul 23, 2018 10:15 AM CDT   NM MPI TREADMILL with MGNM1   [facility] ([facility])    [address] [place] [id number] [phone]           For a ONE day exam: Allow 3-4 hours for test. For a TWO day exam: Allow  minutes PER day for test.  On the day of your resting scan: Please stop eating 3 hours before the test. You may drink water or juice.  On the day of your stress test: Stop all caffeine 12 hours before the test. This includes coffee, tea, soda pop, chocolate and certain medicines (such as Anacin, Excedrin and NoDoz). Also avoid decaf coffee and tea, as these contain small amounts of caffeine.  Stop eating 3 hours before the test. You may drink water.  You may need to stop some medicines before the test. Follow your doctor s orders. - If you take a beta blocker: Do not take your beta-blocker on the day before your test, unless specifically told to by your doctor. And do not take it on the day of your test. Bring it with you to take after the test. - If you take Aggrenox or dipyridamole (Persantine, Permole), stop taking it 48 hours before your test. - If you take Viagra, Cialis or Levitra, stop taking it 48 hours before your test. - If you take theophylline or aminophylline, stop taking it 12 hours before your test.  Do not take nitrates on the day of your test. Do not wear your Nitro-Patch.  Please wear a loose two-piece outfit. If you will have an exercise test, bring rubber-soled walking shoes.  When you arrive, please tell us if you have a pacemaker or ICD (implantable defibrillator).  Please call  "your Imaging Department at your exam site with any questions.            Jul 26, 2018  4:00 PM CDT   Office Visit with Tone Vanessa PA-C   [facility] ([facility]    [address]  [place] [phone] [phone]           Bring a current list of meds and any records pertaining to this visit. For Physicals, please bring immunization records and any forms needing to be filled out. Please arrive 10 minutes early to complete paperwork.              Future tests that were ordered for you today     Open Future Orders        Priority Expected Expires Ordered    NM Exercise stress test Routine  6/18/2019 6/18/2018            Who to contact     Normal or non-critical lab and imaging results will be communicated to you by QuickBloxhart, letter or phone within 4 business days after the clinic has received the results. If you do not hear from us within 7 days, please contact the clinic through QuickBloxhart or phone. If you have a critical or abnormal lab result, we will notify you by phone as soon as possible.  Submit refill requests through Anke or call your pharmacy and they will forward the refill request to us. Please allow 3 business days for your refill to be completed.          If you need to speak with a  for additional information , please call: [phone]             Additional Information About Your Visit        Anke Information     Anke lets you send messages to your doctor, view your test results, renew your prescriptions, schedule appointments and more. To sign up, go to [email or web address] . Click on \"Log in\" on the left side of the screen, which will take you to the Welcome page. Then click on \"Sign up Now\" on the right side of the page.     You will be asked to enter the access code listed below, as well as some personal information. Please follow the directions to create your username and password.     Your access code is: " [email or web address]  Expires: 2018  9:53 AM     Your access code will  in 90 days. If you need help or a new code, please call your [place] clinic or [phone].        Care EveryWhere ID     This is your Care EveryWhere ID. This could be used by other organizations to access your [place] medical records  [id number]        Your Vitals Were     Pulse Respirations Height Pulse Oximetry BMI (Body Mass Index)       92 17 5' 4\" (1.626 m) 100% 27.46 kg/m2        Blood Pressure from Last 3 Encounters:   18 (!) 152/98   03/15/18 135/89   17 117/73    Weight from Last 3 Encounters:   18 160 lb (72.6 kg)   03/15/18 160 lb 6.4 oz (72.8 kg)   17 156 lb 3.2 oz (70.9 kg)              We Performed the Following     Basic metabolic panel  (Ca, Cl, CO2, Creat, Gluc, K, Na, BUN)     GASTROENTEROLOGY ADULT REF PROCEDURE ONLY [facility] [phone]     Respiratory disease profile          Today's Medication Changes          These changes are accurate as of 18 10:08 AM.  If you have any questions, ask your nurse or doctor.               Start taking these medicines.        Dose/Directions    budesonide-formoterol 80-4.5 MCG/ACT Inhaler   Commonly known as:  SYMBICORT   Used for:  Moderate persistent asthma with exacerbation   Started by:  Tone Vanessa PA-C        Dose:  2 puff   Inhale 2 puffs into the lungs 2 times daily   Quantity:  1 Inhaler   Refills:  1       losartan 50 MG tablet   Commonly known as:  COZAAR   Used for:  Benign essential hypertension   Started by:  Tone Vanessa PA-C        Dose:  50 mg   Take 1 tablet (50 mg) by mouth daily   Quantity:  30 tablet   Refills:  1       montelukast 10 MG tablet   Commonly known as:  SINGULAIR   Used for:  Chronic seasonal allergic rhinitis due to pollen, Moderate persistent asthma with exacerbation   Started by:  Tone Vanessa PA-C        Dose:  10 mg   Take 1 tablet (10 mg) by mouth At Bedtime   Quantity:  30 tablet "   Refills:  1         These medicines have changed or have updated prescriptions.        Dose/Directions    albuterol 108 (90 Base) MCG/ACT Inhaler   Commonly known as:  PROAIR HFA/PROVENTIL HFA/VENTOLIN HFA   This may have changed:  Another medication with the same name was removed. Continue taking this medication, and follow the directions you see here.   Used for:  Moderate persistent asthma with exacerbation   Changed by:  Tone Vanessa PA-C        Dose:  2 puff   Inhale 2 puffs into the lungs every 6 hours as needed for shortness of breath / dyspnea or wheezing   Quantity:  3 Inhaler   Refills:  3         Stop taking these medicines if you haven't already. Please contact your care team if you have questions.     azithromycin 250 MG tablet   Commonly known as:  ZITHROMAX   Stopped by:  Tone Vanessa PA-C           benzonatate 100 MG capsule   Commonly known as:  TESSALON   Stopped by:  Tone Vanessa PA-C           cyclobenzaprine 10 MG tablet   Commonly known as:  FLEXERIL   Stopped by:  Tone Vanessa PA-C                Where to get your medicines      These medications were sent to [id number] [place] - [address] AT [facility] [address], [place] [address]     Phone:  [phone]     albuterol 108 (90 Base) MCG/ACT Inhaler    budesonide-formoterol 80-4.5 MCG/ACT Inhaler    losartan 50 MG tablet    montelukast 10 MG tablet                Primary Care Provider Office Phone # Fax #    Tone Vanessa PA-C [phone] [address]  [place] [address]        Equal Access to Services     [facility] : Hadii ave barker hadevelinao Souma, waaxda luqadaha, qaybta kaalmada ademodesto, clarence juan. So [facility] [phone].    ATENCIÓN: Si habla español, tiene a garduno disposición servicios gratuitos de asistencia lingüística. Llame al [phone].    We comply with applicable federal civil  rights laws and [place] laws. We do not discriminate on the basis of race, color, national origin, age, disability, sex, sexual orientation, or gender identity.            Thank you!     Thank you for choosing [facility]  for your care. Our goal is always to provide you with excellent care. Hearing back from our patients is one way we can continue to improve our services. Please take a few minutes to complete the written survey that you may receive in the mail after your visit with us. Thank you!             Your Updated Medication List - Protect others around you: Learn how to safely use, store and throw away your medicines at www.disposemymeds.org.          This list is accurate as of 6/18/18 10:08 AM.  Always use your most recent med list.                   Brand Name Dispense Instructions for use Diagnosis    albuterol 108 (90 Base) MCG/ACT Inhaler    PROAIR HFA/PROVENTIL HFA/VENTOLIN HFA    3 Inhaler    Inhale 2 puffs into the lungs every 6 hours as needed for shortness of breath / dyspnea or wheezing    Moderate persistent asthma with exacerbation       budesonide-formoterol 80-4.5 MCG/ACT Inhaler    SYMBICORT    1 Inhaler    Inhale 2 puffs into the lungs 2 times daily    Moderate persistent asthma with exacerbation       losartan 50 MG tablet    COZAAR    30 tablet    Take 1 tablet (50 mg) by mouth daily    Benign essential hypertension       mometasone-formoterol 100-5 MCG/ACT oral inhaler    DULERA    39 g    Inhale 2 puffs into the lungs 2 times daily    Mild intermittent asthma without complication       montelukast 10 MG tablet    SINGULAIR    30 tablet    Take 1 tablet (10 mg) by mouth At Bedtime    Chronic seasonal allergic rhinitis due to pollen, Moderate persistent asthma with exacerbation

## 2018-06-18 NOTE — LETTER
July 2, 2018      Christ Gomes  [address] [address] [address]        Dear ,    We are writing to inform you of your test results.    Your allergy profile revealed numerous sensitivities to both seasonal and year around allergens. As such, I am referring you to an allergist for further evaluation and treatment.     Your provider has referred you to: [facility]: [facility]  [phone]     Resulted Orders   Basic metabolic panel  (Ca, Cl, CO2, Creat, Gluc, K, Na, BUN)   Result Value Ref Range    Sodium 141 133 - 144 mmol/L    Potassium 3.9 3.4 - 5.3 mmol/L    Chloride 104 94 - 109 mmol/L    Carbon Dioxide 26 20 - 32 mmol/L    Anion Gap 11 3 - 14 mmol/L    Glucose 126 (H) 70 - 99 mg/dL    Urea Nitrogen 15 7 - 30 mg/dL    Creatinine 0.83 0.66 - 1.25 mg/dL    GFR Estimate >90 >60 mL/min/1.7m2      Comment:      Non  GFR Calc    GFR Estimate If Black >90 >60 mL/min/1.7m2      Comment:       GFR Calc    Calcium 9.1 8.5 - 10.1 mg/dL   Respiratory disease profile   Result Value Ref Range     (H) 0 - 114 KIU/L    Allergen Cat Dander <0.10 <0.10 KU(A)/L      Comment:      Interp: Class 0 - Negative, Consider nonallergic causes    Allergen Dog Dander <0.10 <0.10 KU(A)/L      Comment:      Interp: Class 0 - Negative, Consider nonallergic causes    Allergen Bentgrass 1.86 (H) <0.10 KU(A)/L      Comment:      Interp: Class II-Moderate Response, Probably a contributing factor to total   allergic load      Allergen Cocksfoot 1.78 (H) <0.10 KU(A)/L      Comment:      Interp: Class II-Moderate Response, Probably a contributing factor to total   allergic load      Allergen Cockroach 4.05 (H) <0.10 KU(A)/L      Comment:      Interp: Class III - High Response, Clinically relevant    Allergen D farinae 0.20 (H) <0.10 KU(A)/L    Allergen A alternata <0.10 <0.10 KU(A)/L      Comment:      Interp: Class 0 - Negative, Consider nonallergic causes    Allergen Elm 2.99 (H)  <0.10 KU(A)/L      Comment:      Interp: Class II-Moderate Response, Probably a contributing factor to total   allergic load      Allergen [place] 1.38 (H) <0.10 KU(A)/L      Comment:      Interp: Class II-Moderate Response, Probably a contributing factor to total   allergic load      Allergen Oak(white) 43.00 (H) <0.10 KU(A)/L      Comment:      Interp: Class IV - Very High Response, Highly clinically relevant    Allergen, Silver Birch 65.30 (H) <0.10 KU(A)/L      Comment:      Interp: Class v - Very High Response, Highly clinically relevant    Allergen Marshelder 1.50 (H) <0.10 KU(A)/L      Comment:      Interp: Class II-Moderate Response, Probably a contributing factor to total   allergic load      Allergen, Ragweed Short 1.83 (H) <0.10 KU(A)/L      Comment:      Interp: Class II-Moderate Response, Probably a contributing factor to total   allergic load      Allergen, D Pteronyssinus 0.38 (H) <0.10 KU(A)/L      Comment:      Interp: Class I - Low Response, Uncertain clinical relevance    Allergen C herbarum <0.10 <0.10 KU(A)/L      Comment:      Interp: Class 0 - Negative, Consider nonallergic causes    Allergen A fumigatus <0.10 <0.10 KU(A)/L      Comment:      Interp: Class 0 - Negative, Consider nonallergic causes       If you have any questions or concerns, please call the clinic at the number listed above.       Sincerely,        Tone Vanessa PA-C/mp

## 2018-06-18 NOTE — PROGRESS NOTES
SUBJECTIVE:   Christ Gomes is a 48 year old male who presents to clinic today for the following health issues:      Hypertension Follow-up      Outpatient blood pressures are being checked at home.  Results are elevated.    Low Salt Diet: not monitoring salt    Asthma Follow-Up    Was ACT completed today?  No      Respiratory symptoms:   Cough: Yes-     Wheezing: no   Shortness of breath: Yes-  Seems to be in the middle of the night only    Use of short- acting(rescue) inhaler: during the night time    Taking controlled (daily) meds as prescribed: No--only using albuterol prn    ER/UC visits or hospital admissions since last visit: none     Recent asthma triggers that patient is dealing with: humidity       Amount of exercise or physical activity: None    Problems taking medications regularly: No    Medication side effects: none    Diet: regular (no restrictions)      Some off and on left shoulder pain and weakness. Some cervicalgia as well.       Problem list and histories reviewed & adjusted, as indicated.  Additional history: as documented    BP Readings from Last 3 Encounters:   06/18/18 (!) 152/98   03/15/18 135/89   05/30/17 117/73    Wt Readings from Last 3 Encounters:   06/18/18 160 lb (72.6 kg)   03/15/18 160 lb 6.4 oz (72.8 kg)   05/30/17 156 lb 3.2 oz (70.9 kg)                    Reviewed and updated as needed this visit by clinical staff  Tobacco  Allergies  Meds  Problems  Med Hx  Surg Hx  Fam Hx  Soc Hx        Reviewed and updated as needed this visit by Provider  Tobacco  Allergies  Meds  Problems  Med Hx  Surg Hx  Fam Hx  Soc Hx        All other systems negative except as outline above  OBJECTIVE:  Eye exam - right eye normal lid, conjunctiva, cornea, pupil and fundus, left eye normal lid, conjunctiva, cornea, pupil and fundus.  Thyroid not palpable, not enlarged, no nodules detected.  CHEST:no tachypnea, retractions or cyanosis, mild inspiratory wheezing heard diffusely throughout both  lungs, mild expiratory wheezing heard diffusely throughout both lungs and S1, S2 normal, no murmur, no gallop, rate regular.  Shoulder exam shows positive impingement signs are present with pain at high arc of abduction and forward flexion on left  There is tenderness of the supraspinatus tendon.  Foot exam - both sides normal; no swelling, tenderness or skin or vascular lesions. Color and temperature is normal. Sensation is intact. Peripheral pulses are palpable. Toenails are normal.    Christ was seen today for asthma and hypertension.    Diagnoses and all orders for this visit:    Special screening for malignant neoplasms, colon  -     GASTROENTEROLOGY ADULT REF PROCEDURE ONLY [facility] [phone]    Moderate persistent asthma with exacerbation  Comments:  x2 weeks  Orders:  -     albuterol (PROAIR HFA/PROVENTIL HFA/VENTOLIN HFA) 108 (90 Base) MCG/ACT Inhaler; Inhale 2 puffs into the lungs every 6 hours as needed for shortness of breath / dyspnea or wheezing  -     budesonide-formoterol (SYMBICORT) 80-4.5 MCG/ACT Inhaler; Inhale 2 puffs into the lungs 2 times daily  -     montelukast (SINGULAIR) 10 MG tablet; Take 1 tablet (10 mg) by mouth At Bedtime  -     Respiratory disease profile    Benign essential hypertension  -     Basic metabolic panel  (Ca, Cl, CO2, Creat, Gluc, K, Na, BUN)  -     losartan (COZAAR) 50 MG tablet; Take 1 tablet (50 mg) by mouth daily    Chronic seasonal allergic rhinitis due to pollen  -     montelukast (SINGULAIR) 10 MG tablet; Take 1 tablet (10 mg) by mouth At Bedtime  -     Respiratory disease profile    History of chest pain  -     NM Exercise stress test; Future    Other orders  -     Cancel: mometasone-formoterol (DULERA) 100-5 MCG/ACT oral inhaler; Inhale 2 puffs into the lungs 2 times daily      work on lifestyle modification  Recheck of blood pressure in 1 mos.

## 2018-06-19 LAB
A ALTERNATA IGE QN: <0.1 KU(A)/L
A FUMIGATUS IGE QN: <0.1 KU(A)/L
C HERBARUM IGE QN: <0.1 KU(A)/L
CAT DANDER IGG QN: <0.1 KU(A)/L
COCKSFOOT IGE QN: 1.78 KU(A)/L
COMMON RAGWEED IGE QN: 1.83 KU(A)/L
D FARINAE IGE QN: 0.2 KU(A)/L
D PTERONYSS IGE QN: 0.38 KU(A)/L
DOG DANDER+EPITH IGE QN: <0.1 KU(A)/L
IGE SERPL-ACNC: 366 KIU/L (ref 0–114)
MAPLE IGE QN: 1.38 KU(A)/L
MARSH ELDER IGE QN: 1.5 KU(A)/L
RED TOP GRASS IGE QN: 1.86 KU(A)/L
ROACH IGE QN: 4.05 KU(A)/L
SILVER BIRCH IGE QN: 65.3 KU(A)/L
WHITE ELM IGE QN: 2.99 KU(A)/L
WHITE OAK IGE QN: 43 KU(A)/L

## 2018-07-23 ENCOUNTER — RADIANT APPOINTMENT (OUTPATIENT)
Dept: NUCLEAR MEDICINE | Facility: CLINIC | Age: 48
End: 2018-07-23
Attending: PHYSICIAN ASSISTANT
Payer: COMMERCIAL

## 2018-07-23 ENCOUNTER — OFFICE VISIT (OUTPATIENT)
Dept: CARDIOLOGY | Facility: CLINIC | Age: 48
End: 2018-07-23
Attending: PHYSICIAN ASSISTANT
Payer: COMMERCIAL

## 2018-07-23 VITALS — HEART RATE: 58 BPM | DIASTOLIC BLOOD PRESSURE: 96 MMHG | SYSTOLIC BLOOD PRESSURE: 139 MMHG

## 2018-07-23 DIAGNOSIS — R07.9 CHEST PAIN: Primary | ICD-10-CM

## 2018-07-23 DIAGNOSIS — Z87.898 HISTORY OF CHEST PAIN: ICD-10-CM

## 2018-07-23 PROCEDURE — 78452 HT MUSCLE IMAGE SPECT MULT: CPT | Performed by: INTERNAL MEDICINE

## 2018-07-23 PROCEDURE — 93016 CV STRESS TEST SUPVJ ONLY: CPT

## 2018-07-23 PROCEDURE — A9502 TC99M TETROFOSMIN: HCPCS | Performed by: PHYSICIAN ASSISTANT

## 2018-07-23 PROCEDURE — 93017 CV STRESS TEST TRACING ONLY: CPT

## 2018-07-23 NOTE — PROGRESS NOTES
Patient presents today for a Nuclear Exercise Stress Test ordered by MD.  Prior to patient visit, chart prep by CMA included confirmation of order accuracy, reviewed meds for contraindication, reviewed previous EKG's  for trends & concerns, and reviewed medical history.     IV was started in the R AC with a 22g Jelco autocatheter and Myoview tracer was injected, and resting images were completed.      Patient prepped for procedure and IV site was flushed with normal saline.      Blood pressure was taken every 2 minutes during the stress portion & documented in the Muse system.  Once peak heart rate was achieved, the Nuclear Tech injected the Myoview tracer through the IV site and the patient was told to bike for 1 more minute.       Patient offered C/O: Tired, legs tired .     Total dose of Atropine was NONE mg's.   Atropine NDC# 61096-293-98     After completion of exercise portion, patient was monitored in the recovery period with EKG, blood pressure and 02 monitoring for 20 minutes.  Patient education provided by CMA about cardiology interpretation and primary provider will be notified of results.    Once stable, the patient was unhooked from all monitoring devices and taken back to Nuclear for the 2nd set of pictures.     Dr. Medina provided supervision of the tests performed today       Jennifer Mcrae, CCT, Cardiac CMA

## 2018-07-23 NOTE — MR AVS SNAPSHOT
MRN:[id number]                      After Visit Summary   7/23/2018    Christ Gomes    MRN: [id number]           Visit Information        Provider Department      7/23/2018 9:15 AM [employer]; MG CARD; MG IMAGING NURSE; MG STRESS RM [facility]        Your next 10 appointments already scheduled     Jul 23, 2018 10:15 AM CDT   NM MPI TREADMILL with MGNM1   [facility] ([facility])    [address] [place] [address] [phone]           For a ONE day exam: Allow 3-4 hours for test. For a TWO day exam: Allow  minutes PER day for test.  On the day of your resting scan: Please stop eating 3 hours before the test. You may drink water or juice.  On the day of your stress test: Stop all caffeine 12 hours before the test. This includes coffee, tea, soda pop, chocolate and certain medicines (such as Anacin, Excedrin and NoDoz). Also avoid decaf coffee and tea, as these contain small amounts of caffeine.  Stop eating 3 hours before the test. You may drink water.  You may need to stop some medicines before the test. Follow your doctor s orders. - If you take a beta blocker: Do not take your beta-blocker on the day before your test, unless specifically told to by your doctor. And do not take it on the day of your test. Bring it with you to take after the test. - If you take Aggrenox or dipyridamole (Persantine, Permole), stop taking it 48 hours before your test. - If you take Viagra, Cialis or Levitra, stop taking it 48 hours before your test. - If you take theophylline or aminophylline, stop taking it 12 hours before your test.  Do not take nitrates on the day of your test. Do not wear your Nitro-Patch.  Please wear a loose two-piece outfit. If you will have an exercise test, bring rubber-soled walking shoes.  When you arrive, please tell us if you have a pacemaker or ICD (implantable defibrillator).  Please call your  Imaging Department at your exam site with any questions.            2018  4:00 PM CDT   [facility] Visit with Tone Vanessa PA-C   [facility]    [address]  [place] [address] [phone]           Bring a current list of meds and any records pertaining to this visit. For Physicals, please bring immunization records and any forms needing to be filled out. Please arrive 10 minutes early to complete paperwork.              [employer] Information     [employer] is an electronic gateway that provides easy, online access to your medical records. With [employer], you can request a clinic appointment, read your test results, renew a prescription or communicate with your care team.     To sign up for [employer] visit the website at [email or web address]   You will be asked to enter the access code listed below, as well as some personal information. Please follow the directions to create your username and password.     Your access code is: [email or web address]  Expires: 2018  9:53 AM     Your access code will  in 90 days. If you need help or a new code, please contact your [facility] or call [phone] for assistance.        Care EveryWhere ID     This is your Care EveryWhere ID. This could be used by other organizations to access your [facility] medical records  [id number]        Equal Access to Services     YANICK BUSTOS AH: Hadii [address] Souma, waaxda luqadaha, qaybta kaalmada susan, clarence ujan. So [facility] [phone].    ATENCIÓN: Si habla español, tiene a garduno disposición servicios gratuitos de asistencia lingüística. Llame al [phone].    We comply with applicable federal civil rights laws and [place] laws. We do not discriminate on the basis of race, color, national origin, age, disability, sex, sexual orientation, or gender identity.

## 2018-07-26 ENCOUNTER — OFFICE VISIT (OUTPATIENT)
Dept: FAMILY MEDICINE | Facility: CLINIC | Age: 48
End: 2018-07-26
Payer: COMMERCIAL

## 2018-07-26 VITALS
TEMPERATURE: 97.2 F | WEIGHT: 163.8 LBS | HEIGHT: 64 IN | DIASTOLIC BLOOD PRESSURE: 78 MMHG | HEART RATE: 70 BPM | SYSTOLIC BLOOD PRESSURE: 114 MMHG | BODY MASS INDEX: 27.96 KG/M2

## 2018-07-26 DIAGNOSIS — I10 BENIGN ESSENTIAL HYPERTENSION: ICD-10-CM

## 2018-07-26 DIAGNOSIS — J30.1 CHRONIC SEASONAL ALLERGIC RHINITIS DUE TO POLLEN: ICD-10-CM

## 2018-07-26 DIAGNOSIS — J45.41 MODERATE PERSISTENT ASTHMA WITH EXACERBATION: ICD-10-CM

## 2018-07-26 PROCEDURE — 99213 OFFICE O/P EST LOW 20 MIN: CPT | Performed by: PHYSICIAN ASSISTANT

## 2018-07-26 RX ORDER — MONTELUKAST SODIUM 10 MG/1
10 TABLET ORAL AT BEDTIME
Qty: 90 TABLET | Refills: 1 | Status: SHIPPED | OUTPATIENT
Start: 2018-07-26 | End: 2019-04-15

## 2018-07-26 RX ORDER — BUDESONIDE AND FORMOTEROL FUMARATE DIHYDRATE 80; 4.5 UG/1; UG/1
2 AEROSOL RESPIRATORY (INHALATION) 2 TIMES DAILY
Qty: 1 INHALER | Refills: 5 | Status: SHIPPED | OUTPATIENT
Start: 2018-07-26 | End: 2019-04-15

## 2018-07-26 RX ORDER — LOSARTAN POTASSIUM 50 MG/1
50 TABLET ORAL DAILY
Qty: 90 TABLET | Refills: 1 | Status: SHIPPED | OUTPATIENT
Start: 2018-07-26 | End: 2019-04-15

## 2018-07-26 ASSESSMENT — PAIN SCALES - GENERAL: PAINLEVEL: NO PAIN (0)

## 2018-07-26 NOTE — MR AVS SNAPSHOT
After Visit Summary   7/26/2018    Christ Gomes    MRN: [id number]           Patient Information     Date Of Birth          1970        Visit Information        Provider Department      7/26/2018 4:00 PM Tone Vanessa PA-C [facility] [place]        Today's Diagnoses     Moderate persistent asthma with exacerbation        Chronic seasonal allergic rhinitis due to pollen        Benign essential hypertension           Follow-ups after your visit        Additional Services     ALLERGY/ASTHMA ADULT REFERRAL       Your provider has referred you to: G: [facility] [place]  [phone] [email or web address]    Please be aware that coverage of these services is subject to the terms and limitations of your health insurance plan.  Call member services at your health plan with any benefit or coverage questions.      Please bring the following with you to your appointment:    (1) Any X-Rays, CTs or MRIs which have been performed.  Contact the facility where they were done to arrange for  prior to your scheduled appointment.    (2) List of current medications  (3) This referral request   (4) Any documents/labs given to you for this referral                  Who to contact     Normal or non-critical lab and imaging results will be communicated to you by MyChart, letter or phone within 4 business days after the clinic has received the results. If you do not hear from us within 7 days, please contact the clinic through MyChart or phone. If you have a critical or abnormal lab result, we will notify you by phone as soon as possible.  Submit refill requests through [employer] or call your pharmacy and they will forward the refill request to us. Please allow 3 business days for your refill to be completed.          If you need to speak with a  for additional information , please call: [phone]             Additional Information About Your Visit       "  MyChart Information     [employer] lets you send messages to your doctor, view your test results, renew your prescriptions, schedule appointments and more. To sign up, go to [email or web address] . Click on \"Log in\" on the left side of the screen, which will take you to the Welcome page. Then click on \"Sign up Now\" on the right side of the page.     You will be asked to enter the access code listed below, as well as some personal information. Please follow the directions to create your username and password.     Your access code is: [email or web address]  Expires: 2018  9:53 AM     Your access code will  in 90 days. If you need help or a new code, please call your [facility] clinic or [phone].        Care EveryWhere ID     This is your Care EveryWhere ID. This could be used by other organizations to access your [facility] medical records  [id number]        Your Vitals Were     Pulse Temperature Height BMI (Body Mass Index)          70 97.2  F (36.2  C) (Tympanic) 5' 4\" (1.626 m) 28.12 kg/m2         Blood Pressure from Last 3 Encounters:   18 114/78   18 (!) 139/96   18 (!) 152/98    Weight from Last 3 Encounters:   18 163 lb 12.8 oz (74.3 kg)   18 160 lb (72.6 kg)   03/15/18 160 lb 6.4 oz (72.8 kg)              We Performed the Following     ALLERGY/ASTHMA ADULT REFERRAL          Today's Medication Changes          These changes are accurate as of 18  4:29 PM.  If you have any questions, ask your nurse or doctor.               Stop taking these medicines if you haven't already. Please contact your care team if you have questions.     mometasone-formoterol 100-5 MCG/ACT oral inhaler   Commonly known as:  DULERA   Stopped by:  Tone Vanessa PA-C                Where to get your medicines      These medications were sent to [facility] [address] [place] [address], [place] [facility] [address] [facility] [address] [address]  [address], [place] [address]     Phone:  " [phone]     budesonide-formoterol 80-4.5 MCG/ACT Inhaler    losartan 50 MG tablet    montelukast 10 MG tablet                Primary Care Provider Office Phone # Fax #    Tone Vanessa PA-C [phone] [address] [place] [address]        Equal Access to Services     [facility]: Hadii aad ku hadasho Soomaali, waaxda luqadaha, qaybta kaalmada adeegyada, waxay idiin hayaan adeeg kharash la'aan ah. So [facility] [phone].    ATENCIÓN: Si habla español, tiene a garduno disposición servicios gratuitos de asistencia lingüística. Teresa al [phone].    We comply with applicable federal civil rights laws and [place] laws. We do not discriminate on the basis of race, color, national origin, age, disability, sex, sexual orientation, or gender identity.            Thank you!     Thank you for choosing [facility]  for your care. Our goal is always to provide you with excellent care. Hearing back from our patients is one way we can continue to improve our services. Please take a few minutes to complete the written survey that you may receive in the mail after your visit with us. Thank you!             Your Updated Medication List - Protect others around you: Learn how to safely use, store and throw away your medicines at www.disposemymeds.org.          This list is accurate as of 7/26/18  4:29 PM.  Always use your most recent med list.                   Brand Name Dispense Instructions for use Diagnosis    albuterol 108 (90 Base) MCG/ACT Inhaler    PROAIR HFA/PROVENTIL HFA/VENTOLIN HFA    3 Inhaler    Inhale 2 puffs into the lungs every 6 hours as needed for shortness of breath / dyspnea or wheezing    Moderate persistent asthma with exacerbation       budesonide-formoterol 80-4.5 MCG/ACT Inhaler    SYMBICORT    1 Inhaler    Inhale 2 puffs into the lungs 2 times daily    Moderate persistent asthma with exacerbation       losartan 50 MG tablet    COZAAR    90 tablet    Take 1  tablet (50 mg) by mouth daily    Benign essential hypertension       montelukast 10 MG tablet    SINGULAIR    90 tablet    Take 1 tablet (10 mg) by mouth At Bedtime    Chronic seasonal allergic rhinitis due to pollen, Moderate persistent asthma with exacerbation

## 2018-07-26 NOTE — PROGRESS NOTES
SUBJECTIVE:   Christ Gomes is a 48 year old male who presents to clinic today for the following health issues:    Chief Complaint   Patient presents with     Hypertension     Results     stress test     **He is also wanting to go over his stress test results.     Hyperlipidemia Follow-Up      Rate your low fat/cholesterol diet?: good    Taking statin?  No    Other lipid medications/supplements?:  none    Hypertension Follow-up      Outpatient blood pressures are being checked at home.  He does not remember the results.    Low Salt Diet: no added salt      Amount of exercise or physical activity: None    Problems taking medications regularly: No    Medication side effects: none    Diet: regular (no restrictions)            Problem list and histories reviewed & adjusted, as indicated.  Additional history: as documented    Patient Active Problem List   Diagnosis     Hyperlipidemia LDL goal <160     History of hepatitis B     Benign essential hypertension     Moderate persistent asthma with exacerbation     Past Surgical History:   Procedure Laterality Date     ENT SURGERY  6-29-15    Removal of anterior neck mass- Dr. Willard     EXCISE MASS NECK Right 6/29/2015    Procedure: EXCISE MASS NECK;  Surgeon: Florentino Willard MD;  Location:  OR       Social History   Substance Use Topics     Smoking status: Never Smoker     Smokeless tobacco: Never Used     Alcohol use Yes      Comment: social use     Family History   Problem Relation Age of Onset     Hypertension Mother      Hypertension Sister      Hypertension Sister          Current Outpatient Prescriptions   Medication Sig Dispense Refill     budesonide-formoterol (SYMBICORT) 80-4.5 MCG/ACT Inhaler Inhale 2 puffs into the lungs 2 times daily 1 Inhaler 5     losartan (COZAAR) 50 MG tablet Take 1 tablet (50 mg) by mouth daily 90 tablet 1     montelukast (SINGULAIR) 10 MG tablet Take 1 tablet (10 mg) by mouth At Bedtime 90 tablet 1     albuterol (PROAIR HFA/PROVENTIL  HFA/VENTOLIN HFA) 108 (90 Base) MCG/ACT Inhaler Inhale 2 puffs into the lungs every 6 hours as needed for shortness of breath / dyspnea or wheezing (Patient not taking: Reported on 7/26/2018) 3 Inhaler 3     [DISCONTINUED] budesonide-formoterol (SYMBICORT) 80-4.5 MCG/ACT Inhaler Inhale 2 puffs into the lungs 2 times daily (Patient not taking: Reported on 7/26/2018) 1 Inhaler 1     [DISCONTINUED] losartan (COZAAR) 50 MG tablet Take 1 tablet (50 mg) by mouth daily 30 tablet 1     [DISCONTINUED] montelukast (SINGULAIR) 10 MG tablet Take 1 tablet (10 mg) by mouth At Bedtime 30 tablet 1     No Known Allergies  Recent Labs   Lab Test  06/18/18   1012  03/15/18   1622  05/30/17   1655  04/02/16   1030   03/20/15   1623   LDL   --   115*   --   127*   --    --    HDL   --   61   --   59   --    --    TRIG   --   132   --   96   --    --    ALT   --    --   37  57   --    --    CR  0.83   --   0.92  0.72   < >   --    GFRESTIMATED  >90   --   87  >90  Non  GFR Calc     < >   --    GFRESTBLACK  >90   --   >90   GFR Calc    >90   GFR Calc     < >   --    POTASSIUM  3.9   --   4.1  3.8   < >   --    TSH   --    --    --   1.10   --   1.19    < > = values in this interval not displayed.      BP Readings from Last 3 Encounters:   07/26/18 114/78   07/23/18 (!) 139/96   06/18/18 (!) 152/98    Wt Readings from Last 3 Encounters:   07/26/18 163 lb 12.8 oz (74.3 kg)   06/18/18 160 lb (72.6 kg)   03/15/18 160 lb 6.4 oz (72.8 kg)                  Labs reviewed in EPIC    Reviewed and updated as needed this visit by clinical staff  Tobacco  Allergies  Meds  Problems  Med Hx  Surg Hx  Fam Hx  Soc Hx        Reviewed and updated as needed this visit by Provider  Allergies  Meds  Problems         All other systems negative except as outline above  OBJECTIVE:  Eye exam - right eye normal lid, conjunctiva, cornea, pupil and fundus, left eye normal lid, conjunctiva, cornea, pupil and  fundus.  CHEST:chest clear to IPPA, no tachypnea, retractions or cyanosis and S1, S2 normal, no murmur, no gallop, rate regular.  Thyroid not palpable, not enlarged, no nodules detected.  No leg edema  Christ was seen today for hypertension and results.    Diagnoses and all orders for this visit:    Moderate persistent asthma with exacerbation  Comments:  x2 weeks  Orders:  -     budesonide-formoterol (SYMBICORT) 80-4.5 MCG/ACT Inhaler; Inhale 2 puffs into the lungs 2 times daily  -     montelukast (SINGULAIR) 10 MG tablet; Take 1 tablet (10 mg) by mouth At Bedtime  -     ALLERGY/ASTHMA ADULT REFERRAL    Chronic seasonal allergic rhinitis due to pollen  -     montelukast (SINGULAIR) 10 MG tablet; Take 1 tablet (10 mg) by mouth At Bedtime  -     ALLERGY/ASTHMA ADULT REFERRAL    Benign essential hypertension  -     losartan (COZAAR) 50 MG tablet; Take 1 tablet (50 mg) by mouth daily      work on lifestyle modification  Recheck in 6 mos

## 2019-04-15 ENCOUNTER — OFFICE VISIT (OUTPATIENT)
Dept: FAMILY MEDICINE | Facility: CLINIC | Age: 49
End: 2019-04-15
Payer: COMMERCIAL

## 2019-04-15 VITALS
TEMPERATURE: 96.9 F | HEART RATE: 74 BPM | RESPIRATION RATE: 18 BRPM | OXYGEN SATURATION: 96 % | SYSTOLIC BLOOD PRESSURE: 126 MMHG | BODY MASS INDEX: 27.31 KG/M2 | DIASTOLIC BLOOD PRESSURE: 98 MMHG | HEIGHT: 64 IN | WEIGHT: 160 LBS

## 2019-04-15 DIAGNOSIS — Z86.19 HISTORY OF HEPATITIS B: ICD-10-CM

## 2019-04-15 DIAGNOSIS — Z12.11 SPECIAL SCREENING FOR MALIGNANT NEOPLASMS, COLON: Primary | ICD-10-CM

## 2019-04-15 DIAGNOSIS — E78.5 HYPERLIPIDEMIA LDL GOAL <160: ICD-10-CM

## 2019-04-15 DIAGNOSIS — I10 BENIGN ESSENTIAL HYPERTENSION: ICD-10-CM

## 2019-04-15 DIAGNOSIS — J45.41 MODERATE PERSISTENT ASTHMA WITH EXACERBATION: ICD-10-CM

## 2019-04-15 DIAGNOSIS — M77.11 LATERAL EPICONDYLITIS OF RIGHT ELBOW: ICD-10-CM

## 2019-04-15 DIAGNOSIS — Z12.5 SCREENING PSA (PROSTATE SPECIFIC ANTIGEN): ICD-10-CM

## 2019-04-15 DIAGNOSIS — R53.83 FATIGUE, UNSPECIFIED TYPE: ICD-10-CM

## 2019-04-15 DIAGNOSIS — Z00.01 ENCOUNTER FOR ROUTINE ADULT MEDICAL EXAM WITH ABNORMAL FINDINGS: ICD-10-CM

## 2019-04-15 DIAGNOSIS — J30.1 CHRONIC SEASONAL ALLERGIC RHINITIS DUE TO POLLEN: ICD-10-CM

## 2019-04-15 LAB
ALBUMIN SERPL-MCNC: 4 G/DL (ref 3.4–5)
ALP SERPL-CCNC: 57 U/L (ref 40–150)
ALT SERPL W P-5'-P-CCNC: 52 U/L (ref 0–70)
ANION GAP SERPL CALCULATED.3IONS-SCNC: 9 MMOL/L (ref 3–14)
AST SERPL W P-5'-P-CCNC: 30 U/L (ref 0–45)
BASOPHILS # BLD AUTO: 0.1 10E9/L (ref 0–0.2)
BASOPHILS NFR BLD AUTO: 1.2 %
BILIRUB DIRECT SERPL-MCNC: 0.2 MG/DL (ref 0–0.2)
BILIRUB SERPL-MCNC: 0.6 MG/DL (ref 0.2–1.3)
BUN SERPL-MCNC: 14 MG/DL (ref 7–30)
CALCIUM SERPL-MCNC: 9.1 MG/DL (ref 8.5–10.1)
CHLORIDE SERPL-SCNC: 104 MMOL/L (ref 94–109)
CHOLEST SERPL-MCNC: 209 MG/DL
CO2 SERPL-SCNC: 26 MMOL/L (ref 20–32)
CREAT SERPL-MCNC: 0.88 MG/DL (ref 0.66–1.25)
DIFFERENTIAL METHOD BLD: NORMAL
EOSINOPHIL # BLD AUTO: 0.6 10E9/L (ref 0–0.7)
EOSINOPHIL NFR BLD AUTO: 8.2 %
ERYTHROCYTE [DISTWIDTH] IN BLOOD BY AUTOMATED COUNT: 12.7 % (ref 10–15)
GFR SERPL CREATININE-BSD FRML MDRD: >90 ML/MIN/{1.73_M2}
GLUCOSE SERPL-MCNC: 98 MG/DL (ref 70–99)
HCT VFR BLD AUTO: 44.9 % (ref 40–53)
HDLC SERPL-MCNC: 61 MG/DL
HGB BLD-MCNC: 15.7 G/DL (ref 13.3–17.7)
LDLC SERPL CALC-MCNC: 129 MG/DL
LYMPHOCYTES # BLD AUTO: 1.6 10E9/L (ref 0.8–5.3)
LYMPHOCYTES NFR BLD AUTO: 22.9 %
MCH RBC QN AUTO: 29.4 PG (ref 26.5–33)
MCHC RBC AUTO-ENTMCNC: 35 G/DL (ref 31.5–36.5)
MCV RBC AUTO: 84 FL (ref 78–100)
MONOCYTES # BLD AUTO: 0.8 10E9/L (ref 0–1.3)
MONOCYTES NFR BLD AUTO: 12 %
NEUTROPHILS # BLD AUTO: 3.8 10E9/L (ref 1.6–8.3)
NEUTROPHILS NFR BLD AUTO: 55.7 %
NONHDLC SERPL-MCNC: 148 MG/DL
PLATELET # BLD AUTO: 186 10E9/L (ref 150–450)
POTASSIUM SERPL-SCNC: 4.6 MMOL/L (ref 3.4–5.3)
PROT SERPL-MCNC: 7.7 G/DL (ref 6.8–8.8)
RBC # BLD AUTO: 5.34 10E12/L (ref 4.4–5.9)
SODIUM SERPL-SCNC: 139 MMOL/L (ref 133–144)
TRIGL SERPL-MCNC: 93 MG/DL
TSH SERPL DL<=0.005 MIU/L-ACNC: 0.72 MU/L (ref 0.4–4)
WBC # BLD AUTO: 6.9 10E9/L (ref 4–11)

## 2019-04-15 PROCEDURE — 85025 COMPLETE CBC W/AUTO DIFF WBC: CPT | Performed by: PHYSICIAN ASSISTANT

## 2019-04-15 PROCEDURE — 80061 LIPID PANEL: CPT | Performed by: PHYSICIAN ASSISTANT

## 2019-04-15 PROCEDURE — 80048 BASIC METABOLIC PNL TOTAL CA: CPT | Performed by: PHYSICIAN ASSISTANT

## 2019-04-15 PROCEDURE — 84443 ASSAY THYROID STIM HORMONE: CPT | Performed by: PHYSICIAN ASSISTANT

## 2019-04-15 PROCEDURE — G0103 PSA SCREENING: HCPCS | Performed by: PHYSICIAN ASSISTANT

## 2019-04-15 PROCEDURE — 80076 HEPATIC FUNCTION PANEL: CPT | Performed by: PHYSICIAN ASSISTANT

## 2019-04-15 PROCEDURE — 36415 COLL VENOUS BLD VENIPUNCTURE: CPT | Performed by: PHYSICIAN ASSISTANT

## 2019-04-15 PROCEDURE — 99396 PREV VISIT EST AGE 40-64: CPT | Performed by: PHYSICIAN ASSISTANT

## 2019-04-15 PROCEDURE — 99213 OFFICE O/P EST LOW 20 MIN: CPT | Mod: 25 | Performed by: PHYSICIAN ASSISTANT

## 2019-04-15 RX ORDER — LOSARTAN POTASSIUM 100 MG/1
100 TABLET ORAL DAILY
Qty: 30 TABLET | Refills: 1 | Status: SHIPPED | OUTPATIENT
Start: 2019-04-15 | End: 2020-03-25

## 2019-04-15 RX ORDER — BUDESONIDE AND FORMOTEROL FUMARATE DIHYDRATE 80; 4.5 UG/1; UG/1
2 AEROSOL RESPIRATORY (INHALATION) 2 TIMES DAILY
Qty: 1 INHALER | Refills: 11 | Status: SHIPPED | OUTPATIENT
Start: 2019-04-15 | End: 2020-03-31

## 2019-04-15 RX ORDER — ALBUTEROL SULFATE 90 UG/1
2 AEROSOL, METERED RESPIRATORY (INHALATION) EVERY 6 HOURS PRN
Qty: 8.5 G | Refills: 11 | Status: SHIPPED | OUTPATIENT
Start: 2019-04-15 | End: 2020-03-31

## 2019-04-15 RX ORDER — DICLOFENAC SODIUM 75 MG/1
75 TABLET, DELAYED RELEASE ORAL 2 TIMES DAILY
Qty: 30 TABLET | Refills: 0 | Status: SHIPPED | OUTPATIENT
Start: 2019-04-15 | End: 2020-08-18

## 2019-04-15 RX ORDER — MONTELUKAST SODIUM 10 MG/1
10 TABLET ORAL AT BEDTIME
Qty: 90 TABLET | Refills: 1 | Status: SHIPPED | OUTPATIENT
Start: 2019-04-15 | End: 2020-02-18

## 2019-04-15 ASSESSMENT — MIFFLIN-ST. JEOR: SCORE: 1496.76

## 2019-04-15 NOTE — PROGRESS NOTES
SUBJECTIVE:   CC: Christ Gomes is an 48 year old male who presents for preventive health visit.     Healthy Habits:    Do you get at least three servings of calcium containing foods daily (dairy, green leafy vegetables, etc.)? yes    Amount of exercise or daily activities, outside of work: 0 day(s) per week    Problems taking medications regularly No    Medication side effects: No    Have you had an eye exam in the past two years? yes    Do you see a dentist twice per year? no    Do you have sleep apnea, excessive snoring or daytime drowsiness?no    Some fatigue. Lacking energy. Mild snoring at times . Denies depression .    Right lateral elbow pain.   Hypertension Follow-up      Outpatient blood pressures are not being checked.    Low Salt Diet: not monitoring salt    Asthma Follow-Up    Was ACT completed today?    Yes    ACT Total Scores 4/15/2019   ACT TOTAL SCORE (Goal Greater than or Equal to 20) 20   In the past 12 months, how many times did you visit the emergency room for your asthma without being admitted to the hospital? 0   In the past 12 months, how many times were you hospitalized overnight because of your asthma? 0       Recent asthma triggers that patient is dealing with: pollens        Today's PHQ-2 Score:   PHQ-2 ( 1999 Pfizer) 6/18/2018 5/30/2017   Q1: Little interest or pleasure in doing things 0 0   Q2: Feeling down, depressed or hopeless 0 0   PHQ-2 Score 0 0       Abuse: Current or Past(Physical, Sexual or Emotional)- No  Do you feel safe in your environment? Yes    Social History     Tobacco Use     Smoking status: Never Smoker     Smokeless tobacco: Never Used   Substance Use Topics     Alcohol use: Yes     Comment: social use     If you drink alcohol do you typically have >3 drinks per day or >7 drinks per week? No                      Last PSA:   PSA   Date Value Ref Range Status   04/02/2016 0.83 0 - 4 ug/L Final       Reviewed orders with patient. Reviewed health maintenance and updated  "orders accordingly - Yes      Reviewed and updated as needed this visit by clinical staff  Tobacco  Allergies  Meds  Problems  Med Hx  Surg Hx         Reviewed and updated as needed this visit by Provider  Allergies  Meds  Problems  Med Hx  Surg Hx            ROS:  CONSTITUTIONAL: NEGATIVE for fever, chills, change in weight  INTEGUMENTARY/SKIN: NEGATIVE for worrisome rashes, moles or lesions  EYES: NEGATIVE for vision changes or irritation  ENT: NEGATIVE for ear, mouth and throat problems  RESP: NEGATIVE for significant cough or SOB  CV: NEGATIVE for chest pain, palpitations or peripheral edema  GI: NEGATIVE for nausea, abdominal pain, heartburn, or change in bowel habits   male: negative for dysuria, hematuria, decreased urinary stream, erectile dysfunction, urethral discharge  MUSCULOSKELETAL: NEGATIVE for significant arthralgias or myalgia  NEURO: NEGATIVE for weakness, dizziness or paresthesias  PSYCHIATRIC: NEGATIVE for changes in mood or affect    OBJECTIVE:   BP (!) 126/98   Pulse 74   Temp 96.9  F (36.1  C) (Tympanic)   Resp 18   Ht 1.626 m (5' 4\")   Wt 72.6 kg (160 lb)   SpO2 96%   BMI 27.46 kg/m    EXAM:  GENERAL: healthy, alert and no distress  EYES: Eyes grossly normal to inspection, PERRL and conjunctivae and sclerae normal  HENT: ear canals and TM's normal, nose and mouth without ulcers or lesions  NECK: no adenopathy, no asymmetry, masses, or scars and thyroid normal to palpation  RESP: lungs clear to auscultation - no rales, rhonchi or wheezes  CV: regular rate and rhythm, normal S1 S2, no S3 or S4, no murmur, click or rub, no peripheral edema and peripheral pulses strong  ABDOMEN: soft, nontender, no hepatosplenomegaly, no masses and bowel sounds normal  SKIN: no suspicious lesions or rashes  NEURO: Normal strength and tone, mentation intact and speech normal  PSYCH: mentation appears normal, affect normal/bright  ELBOWS: Tender over lateral epicondyle right  side(s).  Resisted " "external rotation and extension of the wrist and fingers reproduces pain.        ASSESSMENT/PLAN:       ICD-10-CM    1. Special screening for malignant neoplasms, colon Z12.11 GASTROENTEROLOGY ADULT REF PROCEDURE ONLY [facility] [phone]; No Provider Preference   2. Hyperlipidemia LDL goal <160 E78.5 Lipid panel reflex to direct LDL Fasting   3. Benign essential hypertension I10 Basic metabolic panel  (Ca, Cl, CO2, Creat, Gluc, K, Na, BUN)     losartan (COZAAR) 100 MG tablet   4. Moderate persistent asthma with exacerbation J45.41 Asthma Action Plan (AAP)     albuterol (PROAIR HFA/PROVENTIL HFA/VENTOLIN HFA) 108 (90 Base) MCG/ACT inhaler     budesonide-formoterol (SYMBICORT) 80-4.5 MCG/ACT Inhaler     montelukast (SINGULAIR) 10 MG tablet   5. Screening PSA (prostate specific antigen) Z12.5 PSA, screen   6. Moderate persistent asthma with exacerbation J45.41 Asthma Action Plan (AAP)     albuterol (PROAIR HFA/PROVENTIL HFA/VENTOLIN HFA) 108 (90 Base) MCG/ACT inhaler     budesonide-formoterol (SYMBICORT) 80-4.5 MCG/ACT Inhaler     montelukast (SINGULAIR) 10 MG tablet    x2 weeks   7. Chronic seasonal allergic rhinitis due to pollen J30.1 montelukast (SINGULAIR) 10 MG tablet   8. Encounter for routine adult medical exam with abnormal findings Z00.01    9. History of hepatitis B Z86.19 Hepatic panel (Albumin, ALT, AST, Bili, Alk Phos, TP)   10. Fatigue, unspecified type R53.83 TSH with free T4 reflex     CBC with platelets differential   11. Lateral epicondylitis of right elbow M77.11 diclofenac (VOLTAREN) 75 MG EC tablet       COUNSELING:  Reviewed preventive health counseling, as reflected in patient instructions       Regular exercise       Healthy diet/nutrition    BP Readings from Last 1 Encounters:   04/15/19 (!) 126/98     Estimated body mass index is 27.46 kg/m  as calculated from the following:    Height as of this encounter: 1.626 m (5' 4\").    Weight as of this encounter: 72.6 kg (160 " lb).      Weight management plan: Discussed healthy diet and exercise guidelines     reports that he has never smoked. He has never used smokeless tobacco.      Counseling Resources:  ATP IV Guidelines  Pooled Cohorts Equation Calculator  FRAX Risk Assessment  ICSI Preventive Guidelines  Dietary Guidelines for Americans, 2010  USDA's MyPlate  ASA Prophylaxis  Lung CA Screening    Tone Vanessa PA-C  [facility]

## 2019-04-15 NOTE — LETTER
May 14, 2019      Christ Gomes  [address] [facility] [address]        Dear ,    We are writing to inform you of your test results.    Your recent lab work came back nice and normal. Your cholesterol numbers are in the high normal range. In an effort to lower them a little, work on a Lower fat, higher fiber diet and consistent exercise.     Resulted Orders   Lipid panel reflex to direct LDL Fasting   Result Value Ref Range    Cholesterol 209 (H) <200 mg/dL      Comment:      Desirable:       <200 mg/dl    Triglycerides 93 <150 mg/dL      Comment:      Fasting specimen    HDL Cholesterol 61 >39 mg/dL    LDL Cholesterol Calculated 129 (H) <100 mg/dL      Comment:      Above desirable:  100-129 mg/dl  Borderline High:  130-159 mg/dL  High:             160-189 mg/dL  Very high:       >189 mg/dl      Non HDL Cholesterol 148 (H) <130 mg/dL      Comment:      Above Desirable:  130-159 mg/dl  Borderline high:  160-189 mg/dl  High:             190-219 mg/dl  Very high:       >219 mg/dl     Basic metabolic panel  (Ca, Cl, CO2, Creat, Gluc, K, Na, BUN)   Result Value Ref Range    Sodium 139 133 - 144 mmol/L    Potassium 4.6 3.4 - 5.3 mmol/L    Chloride 104 94 - 109 mmol/L    Carbon Dioxide 26 20 - 32 mmol/L    Anion Gap 9 3 - 14 mmol/L    Glucose 98 70 - 99 mg/dL      Comment:      Fasting specimen    Urea Nitrogen 14 7 - 30 mg/dL    Creatinine 0.88 0.66 - 1.25 mg/dL    GFR Estimate >90 >60 mL/min/[1.73_m2]      Comment:      Non  GFR Calc  Starting 12/18/2018, serum creatinine based estimated GFR (eGFR) will be   calculated using the Chronic Kidney Disease Epidemiology Collaboration   (CKD-EPI) equation.      GFR Estimate If Black >90 >60 mL/min/[1.73_m2]      Comment:       GFR Calc  Starting 12/18/2018, serum creatinine based estimated GFR (eGFR) will be   calculated using the Chronic Kidney Disease Epidemiology Collaboration   (CKD-EPI) equation.      Calcium 9.1 8.5 - 10.1 mg/dL    PSA, screen   Result Value Ref Range    PSA 0.90 0 - 4 ug/L      Comment:      Assay Method:  Chemiluminescence using Siemens Vista analyzer   Hepatic panel (Albumin, ALT, AST, Bili, Alk Phos, TP)   Result Value Ref Range    Bilirubin Direct 0.2 0.0 - 0.2 mg/dL    Bilirubin Total 0.6 0.2 - 1.3 mg/dL    Albumin 4.0 3.4 - 5.0 g/dL    Protein Total 7.7 6.8 - 8.8 g/dL    Alkaline Phosphatase 57 40 - 150 U/L    ALT 52 0 - 70 U/L    AST 30 0 - 45 U/L   TSH with free T4 reflex   Result Value Ref Range    TSH 0.72 0.40 - 4.00 mU/L   CBC with platelets differential   Result Value Ref Range    WBC 6.9 4.0 - 11.0 10e9/L    RBC Count 5.34 4.4 - 5.9 10e12/L    Hemoglobin 15.7 13.3 - 17.7 g/dL    Hematocrit 44.9 40.0 - 53.0 %    MCV 84 78 - 100 fl    MCH 29.4 26.5 - 33.0 pg    MCHC 35.0 31.5 - 36.5 g/dL    RDW 12.7 10.0 - 15.0 %    Platelet Count 186 150 - 450 10e9/L    % Neutrophils 55.7 %    % Lymphocytes 22.9 %    % Monocytes 12.0 %    % Eosinophils 8.2 %    % Basophils 1.2 %    Absolute Neutrophil 3.8 1.6 - 8.3 10e9/L    Absolute Lymphocytes 1.6 0.8 - 5.3 10e9/L    Absolute Monocytes 0.8 0.0 - 1.3 10e9/L    Absolute Eosinophils 0.6 0.0 - 0.7 10e9/L    Absolute Basophils 0.1 0.0 - 0.2 10e9/L    Diff Method Automated Method        If you have any questions or concerns, please call the clinic at the number listed above.       Sincerely,        Tone Vanessa PA-C/eduardo

## 2019-04-16 LAB — PSA SERPL-ACNC: 0.9 UG/L (ref 0–4)

## 2019-04-16 ASSESSMENT — ASTHMA QUESTIONNAIRES: ACT_TOTALSCORE: 20

## 2020-01-13 ENCOUNTER — ALLIED HEALTH/NURSE VISIT (OUTPATIENT)
Dept: NURSING | Facility: CLINIC | Age: 50
End: 2020-01-13
Payer: COMMERCIAL

## 2020-01-13 DIAGNOSIS — Z23 IMMUNIZATION DUE: Primary | ICD-10-CM

## 2020-01-13 PROCEDURE — 90750 HZV VACC RECOMBINANT IM: CPT

## 2020-01-13 PROCEDURE — 99207 ZZC NO CHARGE NURSE ONLY: CPT

## 2020-01-13 PROCEDURE — 90471 IMMUNIZATION ADMIN: CPT

## 2020-01-13 NOTE — PROGRESS NOTES
Prior to immunization administration, verified patients identity using patient s name and date of birth. Please see Immunization Activity for additional information.     Screening Questionnaire for Adult Immunization    Are you sick today?   No   Do you have allergies to medications, food, a vaccine component or latex?   No   Have you ever had a serious reaction after receiving a vaccination?   No   Do you have a long-term health problem with heart, lung, kidney, or metabolic disease (e.g., diabetes), asthma, a blood disorder, no spleen, complement component deficiency, a cochlear implant, or a spinal fluid leak?  Are you on long-term aspirin therapy?   No   Do you have cancer, leukemia, HIV/AIDS, or any other immune system problem?   No   Do you have a parent, brother, or sister with an immune system problem?   No   In the past 3 months, have you taken medications that affect  your immune system, such as prednisone, other steroids, or anticancer drugs; drugs for the treatment of rheumatoid arthritis, Crohn s disease, or psoriasis; or have you had radiation treatments?   No   Have you had a seizure, or a brain or other nervous system problem?   No   During the past year, have you received a transfusion of blood or blood    products, or been given immune (gamma) globulin or antiviral drug?   No   For women: Are you pregnant or is there a chance you could become       pregnant during the next month?   No   Have you received any vaccinations in the past 4 weeks?   No     Immunization questionnaire answers were all negative.        Per orders of Tone Vanessa, injection of shingrix given by Luh Mensah. Patient instructed to remain in clinic for 15 minutes afterwards, and to report any adverse reaction to me immediately.       Screening performed by Luh Mensah on 1/13/2020 at 3:53 PM.

## 2020-02-18 DIAGNOSIS — J30.1 CHRONIC SEASONAL ALLERGIC RHINITIS DUE TO POLLEN: ICD-10-CM

## 2020-02-18 DIAGNOSIS — J45.41 MODERATE PERSISTENT ASTHMA WITH EXACERBATION: ICD-10-CM

## 2020-02-18 NOTE — TELEPHONE ENCOUNTER
"Requested Prescriptions   Pending Prescriptions Disp Refills     montelukast (SINGULAIR) 10 MG tablet [Pharmacy Med Name: MONTELUKAST 10MG TABLETS] 90 tablet 1     Sig: TAKE 1 TABLET(10 MG) BY MOUTH AT BEDTIME   Last Written Prescription Date:  2-18-20  Last Fill Quantity: 90,  # refills: 1   Last office visit: 4/15/2019 with prescribing provider:  4-15-19   Future Office Visit:      Leukotriene Inhibitors Protocol Failed - 2/18/2020  9:03 AM        Failed - Asthma control assessment score within normal limits in last 6 months     Please review ACT score.           Failed - Recent (6 mo) or future (30 days) visit within the authorizing provider's specialty     Patient had office visit in the last 6 months or has a visit in the next 30 days with authorizing provider or within the authorizing provider's specialty.  See \"Patient Info\" tab in inbasket, or \"Choose Columns\" in Meds & Orders section of the refill encounter.            Passed - Patient is age 12 or older     If patient is under 16, ok to refill using age based dosing.           Passed - Medication is active on med list        "

## 2020-02-19 RX ORDER — MONTELUKAST SODIUM 10 MG/1
TABLET ORAL
Qty: 30 TABLET | Refills: 0 | Status: SHIPPED | OUTPATIENT
Start: 2020-02-19 | End: 2020-03-02

## 2020-02-19 NOTE — TELEPHONE ENCOUNTER
Routing refill request to provider for review/approval because:  Labs not current:  Act  Asthma Control Test 4/15/2019   ACT Total Score (Goal Greater than or Equal to 20) 20     Patient needs to be seen because:  Pt needs 6 month follow up for asthma

## 2020-03-02 ENCOUNTER — TELEPHONE (OUTPATIENT)
Dept: FAMILY MEDICINE | Facility: CLINIC | Age: 50
End: 2020-03-02

## 2020-03-02 DIAGNOSIS — J45.41 MODERATE PERSISTENT ASTHMA WITH EXACERBATION: ICD-10-CM

## 2020-03-02 DIAGNOSIS — J30.1 CHRONIC SEASONAL ALLERGIC RHINITIS DUE TO POLLEN: ICD-10-CM

## 2020-03-02 RX ORDER — MONTELUKAST SODIUM 10 MG/1
TABLET ORAL
Qty: 30 TABLET | Refills: 0 | Status: SHIPPED | OUTPATIENT
Start: 2020-03-02 | End: 2020-03-25

## 2020-03-02 NOTE — LETTER
[facility]  [address]  [place] [address]  Phone: [phone]        March 11, 2020      Christ Gomes                                                                                                                                [address] [place] [address]            Dear Mr. Gomes,    We are concerned about your health care.  We recently provided you with a medication refill.  Many medications require routine follow-up with your Doctor.      At this time we ask that: You schedule a routine office visit with your physician to follow your asthma & allergies.     Your prescription: Has been refilled for 1 month so you may have time for the above noted follow-up.      Thank you,      Tone Vanessa PA-C/aashish

## 2020-03-31 ENCOUNTER — TELEPHONE (OUTPATIENT)
Dept: FAMILY MEDICINE | Facility: CLINIC | Age: 50
End: 2020-03-31

## 2020-03-31 ENCOUNTER — VIRTUAL VISIT (OUTPATIENT)
Dept: FAMILY MEDICINE | Facility: CLINIC | Age: 50
End: 2020-03-31
Payer: COMMERCIAL

## 2020-03-31 DIAGNOSIS — J45.41 MODERATE PERSISTENT ASTHMA WITH EXACERBATION: ICD-10-CM

## 2020-03-31 DIAGNOSIS — J30.1 CHRONIC SEASONAL ALLERGIC RHINITIS DUE TO POLLEN: ICD-10-CM

## 2020-03-31 DIAGNOSIS — I10 BENIGN ESSENTIAL HYPERTENSION: ICD-10-CM

## 2020-03-31 DIAGNOSIS — J45.41 MODERATE PERSISTENT ASTHMA WITH EXACERBATION: Primary | ICD-10-CM

## 2020-03-31 PROCEDURE — 99214 OFFICE O/P EST MOD 30 MIN: CPT | Mod: TEL | Performed by: PHYSICIAN ASSISTANT

## 2020-03-31 RX ORDER — BUDESONIDE AND FORMOTEROL FUMARATE DIHYDRATE 80; 4.5 UG/1; UG/1
2 AEROSOL RESPIRATORY (INHALATION) 2 TIMES DAILY
Qty: 1 INHALER | Refills: 11 | Status: SHIPPED | OUTPATIENT
Start: 2020-03-31 | End: 2020-04-01

## 2020-03-31 RX ORDER — LOSARTAN POTASSIUM 100 MG/1
100 TABLET ORAL DAILY
Qty: 90 TABLET | Refills: 0 | Status: SHIPPED | OUTPATIENT
Start: 2020-03-31 | End: 2020-07-15

## 2020-03-31 RX ORDER — ALBUTEROL SULFATE 90 UG/1
1-2 POWDER, METERED RESPIRATORY (INHALATION) EVERY 6 HOURS PRN
Qty: 1 INHALER | Refills: 11 | Status: SHIPPED | OUTPATIENT
Start: 2020-03-31 | End: 2021-05-11

## 2020-03-31 RX ORDER — MONTELUKAST SODIUM 10 MG/1
TABLET ORAL
Qty: 90 TABLET | Refills: 1 | Status: SHIPPED | OUTPATIENT
Start: 2020-03-31 | End: 2021-05-11

## 2020-03-31 NOTE — PROGRESS NOTES
"Subjective     Christ Gomes is a 49 year old male who is being evaluated via a billable telephone visit.      The patient has been notified of following:     \"This telephone visit will be conducted via a call between you and your physician/provider. We have found that certain health care needs can be provided without the need for a physical exam.  This service lets us provide the care you need with a short phone conversation.  If a prescription is necessary we can send it directly to your pharmacy.  If lab work is needed we can place an order for that and you can then stop by our lab to have the test done at a later time.    If during the course of the call the physician/provider feels a telephone visit is not appropriate, you will not be charged for this service.\"     Patient has given verbal consent for Telephone visit?  Yes    Christ Gomes complains of No chief complaint on file.      ALLERGIES  Patient has no known allergies.    Asthma Follow-Up    Was ACT completed today?  No      Do you have a cough?  No    Are you experiencing any wheezing in your chest?  No    Do you have any shortness of breath?  YES     How often are you using a short-acting (rescue) inhaler or nebulizer, such as Albuterol?  A few times a week    How many days per week do you miss taking your asthma controller medication?  0    Please describe any recent triggers for your asthma: Patient is unaware of triggers    Have you had any Emergency Room Visits, Urgent Care Visits, or Hospital Admissions since your last office visit?  No         Some intermittent sob.   Not using symbicort.    Recheck of his blood pressure: home numbers within normal range.  Denies chest pain/palps/ha's/dizziness      Patient Active Problem List   Diagnosis     Hyperlipidemia LDL goal <160     History of hepatitis B     Benign essential hypertension     Moderate persistent asthma with exacerbation     Past Surgical History:   Procedure Laterality Date     ENT SURGERY  " 6-29-15    Removal of anterior neck mass- Dr. Willard     EXCISE MASS NECK Right 6/29/2015    Procedure: EXCISE MASS NECK;  Surgeon: Florentino Willard MD;  Location:  OR       Social History     Tobacco Use     Smoking status: Never Smoker     Smokeless tobacco: Never Used   Substance Use Topics     Alcohol use: Yes     Comment: social use     Family History   Problem Relation Age of Onset     Hypertension Mother      Hypertension Sister      Hypertension Sister          Current Outpatient Medications   Medication Sig Dispense Refill     albuterol (PROAIR HFA/PROVENTIL HFA/VENTOLIN HFA) 108 (90 Base) MCG/ACT inhaler Inhale 2 puffs into the lungs every 6 hours as needed for shortness of breath / dyspnea or wheezing 8.5 g 11     budesonide-formoterol (SYMBICORT) 80-4.5 MCG/ACT Inhaler Inhale 2 puffs into the lungs 2 times daily (Patient not taking: Reported on 3/24/2020) 1 Inhaler 11     diclofenac (VOLTAREN) 75 MG EC tablet Take 1 tablet (75 mg) by mouth 2 times daily (Patient not taking: Reported on 3/24/2020) 30 tablet 0     losartan (COZAAR) 100 MG tablet Take 1 tablet (100 mg) by mouth daily 30 tablet 1     montelukast (SINGULAIR) 10 MG tablet TAKE 1 TABLET(10 MG) BY MOUTH AT BEDTIME.  Due to be seen in April 2020 30 tablet 0     No Known Allergies  Recent Labs   Lab Test 04/15/19  1009 06/18/18  1012 03/15/18  1622 05/30/17  1655 04/02/16  1030   *  --  115*  --  127*   HDL 61  --  61  --  59   TRIG 93  --  132  --  96   ALT 52  --   --  37 57   CR 0.88 0.83  --  0.92 0.72   GFRESTIMATED >90 >90  --  87 >90  Non  GFR Calc     GFRESTBLACK >90 >90  --  >90   GFR Calc   >90   GFR Calc     POTASSIUM 4.6 3.9  --  4.1 3.8   TSH 0.72  --   --   --  1.10      BP Readings from Last 3 Encounters:   04/15/19 (!) 126/98   07/26/18 114/78   07/23/18 (!) 139/96    Wt Readings from Last 3 Encounters:   04/15/19 72.6 kg (160 lb)   07/26/18 74.3 kg (163 lb 12.8 oz)    06/18/18 72.6 kg (160 lb)                    Reviewed and updated as needed this visit by Provider         Review of Systems   ROS COMP: Constitutional, HEENT, cardiovascular, pulmonary, GI, , musculoskeletal, neuro, skin, endocrine and psych systems are negative, except as otherwise noted.             Assessment/Plan:  1. Benign essential hypertension  stable  - losartan (COZAAR) 100 MG tablet; Take 1 tablet (100 mg) by mouth daily  Dispense: 90 tablet; Refill: 0    2. Moderate persistent asthma with exacerbation  Not adequately controlled due to not using his symbicort. Will restart this today and urged him to use it bid every day.  - budesonide-formoterol (SYMBICORT) 80-4.5 MCG/ACT Inhaler; Inhale 2 puffs into the lungs 2 times daily  Dispense: 1 Inhaler; Refill: 11  - montelukast (SINGULAIR) 10 MG tablet; TAKE 1 TABLET(10 MG) BY MOUTH AT BEDTIME.  Due to be seen in April 2020  Dispense: 90 tablet; Refill: 1  - albuterol (PROAIR RESPICLICK) 108 (90 Base) MCG/ACT inhaler; Inhale 1-2 puffs into the lungs every 6 hours as needed for wheezing  Dispense: 1 Inhaler; Refill: 11    3. Chronic seasonal allergic rhinitis due to pollen  stable  - montelukast (SINGULAIR) 10 MG tablet; TAKE 1 TABLET(10 MG) BY MOUTH AT BEDTIME.  Due to be seen in April 2020  Dispense: 90 tablet; Refill: 1    Advised supportive and symptomatic treatment.  Follow up with Provider - if condition persists or worsens.   work on lifestyle modification  Recheck in 3 mos     Phone call duration:  9 minutes    Tone Vanessa PA-C

## 2020-03-31 NOTE — TELEPHONE ENCOUNTER
Patient plan does not cover Budesonide/ Form, the preferred alternative is AdvairHFA, BreoEllipta, Dulera, or Symbicort.

## 2020-04-01 NOTE — TELEPHONE ENCOUNTER
rx for dulera routed to [facility]

## 2020-07-14 DIAGNOSIS — I10 BENIGN ESSENTIAL HYPERTENSION: ICD-10-CM

## 2020-07-16 RX ORDER — LOSARTAN POTASSIUM 100 MG/1
TABLET ORAL
Qty: 30 TABLET | Refills: 0 | Status: SHIPPED | OUTPATIENT
Start: 2020-07-16 | End: 2021-05-11

## 2020-07-16 NOTE — TELEPHONE ENCOUNTER
gaby is due for a recheck. Have him make an appt to see me.for a face to face visit. Schedule him for a physical

## 2020-07-16 NOTE — TELEPHONE ENCOUNTER
"Routing refill request to provider for review/approval because:  Labs not current:  Bp, cr, K+,  Patient needs to be seen because:  Virtual visit on 3/21/20, due for follow up 6/21/20    Medication pended for approval, 30 day supply with reminder.                 Requested Prescriptions   Pending Prescriptions Disp Refills     losartan (COZAAR) 100 MG tablet [Pharmacy Med Name: LOSARTAN 100MG TABLETS] 30 tablet 0     Sig: TAKE 1 TABLET(100 MG) BY MOUTH DAILY       Angiotensin-II Receptors Failed - 7/14/2020 12:31 PM        Failed - Last blood pressure under 140/90 in past 12 months     BP Readings from Last 3 Encounters:   04/15/19 (!) 126/98   07/26/18 114/78   07/23/18 (!) 139/96                 Failed - Normal serum creatinine on file in past 12 months     Recent Labs   Lab Test 04/15/19  1009   CR 0.88       Ok to refill medication if creatinine is low          Failed - Normal serum potassium on file in past 12 months     Recent Labs   Lab Test 04/15/19  1009   POTASSIUM 4.6                    Passed - Recent (12 mo) or future (30 days) visit within the authorizing provider's specialty     Patient has had an office visit with the authorizing provider or a provider within the authorizing providers department within the previous 12 mos or has a future within next 30 days. See \"Patient Info\" tab in inbasket, or \"Choose Columns\" in Meds & Orders section of the refill encounter.              Passed - Medication is active on med list        Passed - Patient is age 18 or older             "

## 2020-08-03 ENCOUNTER — ALLIED HEALTH/NURSE VISIT (OUTPATIENT)
Dept: NURSING | Facility: CLINIC | Age: 50
End: 2020-08-03
Payer: COMMERCIAL

## 2020-08-03 DIAGNOSIS — Z23 NEED FOR VACCINATION: Primary | ICD-10-CM

## 2020-08-03 PROCEDURE — 90471 IMMUNIZATION ADMIN: CPT

## 2020-08-03 PROCEDURE — 90750 HZV VACC RECOMBINANT IM: CPT

## 2020-08-03 NOTE — PROGRESS NOTES
Prior to immunization administration, verified patients identity using patient s name and date of birth. Please see Immunization Activity for additional information.   Screening Questionnaire for Adult Immunization  Are you sick today?   No   Do you have allergies to medications, food, a vaccine component or latex?   No   Have you ever had a serious reaction after receiving a vaccination?   No   Do you have a long-term health problem with heart, lung, kidney, or metabolic disease (e.g., diabetes), asthma, a blood disorder, no spleen, complement component deficiency, a cochlear implant, or a spinal fluid leak?  Are you on long-term aspirin therapy?   No   Do you have cancer, leukemia, HIV/AIDS, or any other immune system problem?   No   Do you have a parent, brother, or sister with an immune system problem?   No   In the past 3 months, have you taken medications that affect  your immune system, such as prednisone, other steroids, or anticancer drugs; drugs for the treatment of rheumatoid arthritis, Crohn s disease, or psoriasis; or have you had radiation treatments?   No   Have you had a seizure, or a brain or other nervous system problem?   No   During the past year, have you received a transfusion of blood or blood    products, or been given immune (gamma) globulin or antiviral drug?   No   For women: Are you pregnant or is there a chance you could become       pregnant during the next month?   No   Have you received any vaccinations in the past 4 weeks?   No   Immunization questionnaire answers were all negative.   Maria Isabel Jin CMA

## 2020-08-04 ENCOUNTER — TELEPHONE (OUTPATIENT)
Dept: FAMILY MEDICINE | Facility: CLINIC | Age: 50
End: 2020-08-04

## 2020-08-04 NOTE — TELEPHONE ENCOUNTER
Informed patient that headache and fever are common side effects of shot.  Advised patient to use tylenol as needed.  Went over tylenol dosing with patient .  Patient verbalized understanding

## 2020-08-18 ENCOUNTER — OFFICE VISIT (OUTPATIENT)
Dept: FAMILY MEDICINE | Facility: CLINIC | Age: 50
End: 2020-08-18
Payer: COMMERCIAL

## 2020-08-18 VITALS
WEIGHT: 160.4 LBS | HEIGHT: 65 IN | DIASTOLIC BLOOD PRESSURE: 79 MMHG | OXYGEN SATURATION: 97 % | TEMPERATURE: 97.9 F | SYSTOLIC BLOOD PRESSURE: 116 MMHG | BODY MASS INDEX: 26.73 KG/M2 | HEART RATE: 69 BPM | RESPIRATION RATE: 16 BRPM

## 2020-08-18 DIAGNOSIS — Z11.4 ENCOUNTER FOR SCREENING FOR HIV: ICD-10-CM

## 2020-08-18 DIAGNOSIS — E78.5 HYPERLIPIDEMIA LDL GOAL <160: ICD-10-CM

## 2020-08-18 DIAGNOSIS — I10 BENIGN ESSENTIAL HYPERTENSION: ICD-10-CM

## 2020-08-18 DIAGNOSIS — Z00.00 ROUTINE GENERAL MEDICAL EXAMINATION AT A HEALTH CARE FACILITY: Primary | ICD-10-CM

## 2020-08-18 DIAGNOSIS — Z12.11 COLON CANCER SCREENING: ICD-10-CM

## 2020-08-18 DIAGNOSIS — Z12.5 SCREENING PSA (PROSTATE SPECIFIC ANTIGEN): ICD-10-CM

## 2020-08-18 DIAGNOSIS — M72.2 PLANTAR FASCIITIS: ICD-10-CM

## 2020-08-18 DIAGNOSIS — J45.41 MODERATE PERSISTENT ASTHMA WITH EXACERBATION: ICD-10-CM

## 2020-08-18 LAB
ANION GAP SERPL CALCULATED.3IONS-SCNC: 5 MMOL/L (ref 3–14)
BUN SERPL-MCNC: 20 MG/DL (ref 7–30)
CALCIUM SERPL-MCNC: 8.8 MG/DL (ref 8.5–10.1)
CHLORIDE SERPL-SCNC: 107 MMOL/L (ref 94–109)
CHOLEST SERPL-MCNC: 203 MG/DL
CO2 SERPL-SCNC: 27 MMOL/L (ref 20–32)
CREAT SERPL-MCNC: 0.8 MG/DL (ref 0.66–1.25)
GFR SERPL CREATININE-BSD FRML MDRD: >90 ML/MIN/{1.73_M2}
GLUCOSE SERPL-MCNC: 103 MG/DL (ref 70–99)
HDLC SERPL-MCNC: 63 MG/DL
LDLC SERPL CALC-MCNC: 131 MG/DL
NONHDLC SERPL-MCNC: 140 MG/DL
POTASSIUM SERPL-SCNC: 4.1 MMOL/L (ref 3.4–5.3)
PSA SERPL-ACNC: 1.08 UG/L (ref 0–4)
SODIUM SERPL-SCNC: 139 MMOL/L (ref 133–144)
TRIGL SERPL-MCNC: 47 MG/DL

## 2020-08-18 PROCEDURE — 80048 BASIC METABOLIC PNL TOTAL CA: CPT | Performed by: PHYSICIAN ASSISTANT

## 2020-08-18 PROCEDURE — 80061 LIPID PANEL: CPT | Performed by: PHYSICIAN ASSISTANT

## 2020-08-18 PROCEDURE — G0103 PSA SCREENING: HCPCS | Performed by: PHYSICIAN ASSISTANT

## 2020-08-18 PROCEDURE — 87389 HIV-1 AG W/HIV-1&-2 AB AG IA: CPT | Performed by: PHYSICIAN ASSISTANT

## 2020-08-18 PROCEDURE — 36415 COLL VENOUS BLD VENIPUNCTURE: CPT | Performed by: PHYSICIAN ASSISTANT

## 2020-08-18 PROCEDURE — 99213 OFFICE O/P EST LOW 20 MIN: CPT | Mod: 25 | Performed by: PHYSICIAN ASSISTANT

## 2020-08-18 PROCEDURE — 99396 PREV VISIT EST AGE 40-64: CPT | Performed by: PHYSICIAN ASSISTANT

## 2020-08-18 RX ORDER — DICLOFENAC SODIUM 75 MG/1
75 TABLET, DELAYED RELEASE ORAL 2 TIMES DAILY
Qty: 30 TABLET | Refills: 0 | Status: SHIPPED | OUTPATIENT
Start: 2020-08-18 | End: 2021-12-14

## 2020-08-18 RX ORDER — ACETAMINOPHEN 325 MG/1
325-650 TABLET ORAL EVERY 6 HOURS PRN
COMMUNITY
End: 2023-07-31

## 2020-08-18 ASSESSMENT — MIFFLIN-ST. JEOR: SCORE: 1506.94

## 2020-08-18 NOTE — PROGRESS NOTES
3  SUBJECTIVE:   CC: Christ Gomes is an 50 year old male who presents for preventive health visit.     Healthy Habits:    Do you get at least three servings of calcium containing foods daily (dairy, green leafy vegetables, etc.)? 1 a day    Amount of exercise or daily activities, outside of work: none    Problems taking medications regularly No    Medication side effects: Yes inhalers can make his throat feel tight, smaller    Have you had an eye exam in the past two years? yes    Do you see a dentist twice per year? no    Do you have sleep apnea, excessive snoring or daytime drowsiness?yes  Recheck of his htn. Home numbers running with in normal range.    Recheck of his asthma. Well controlled currently. ACT : 20    CHEST sensation     Onset: 1 month ago    Description:   Location:  left side  Character: pin pricks inside  Radiation: none  Duration: a few minutes     Intensity: mild    Progression of Symptoms:  Worsening?    Accompanying Signs & Symptoms:  Shortness of breath: no   Sweating: no   Nausea/vomiting: no   Lightheadedness: no  Palpitations: no   Fever/Chills: no   Cough: no   Heartburn: no     History:   Family history of heart disease YES  Tobacco use: no     Precipitating factors:   Worse with exertion: no   Worse with deep breaths :  YES  Related to food: no     Alleviating factors:  none  No history of exertional chest pain. No obvious palpitations. No rash     Therapies Tried and outcome: none    Right Heel Pain    Onset: 2 months    Description:   Location: right heel  Character: Sharp, only when he first gets up in the morning    Intensity: moderate    Progression of Symptoms: same    Accompanying Signs & Symptoms:  Other symptoms: none    History:   Previous similar pain: YES  - broken foot 20 years      Therapies Tried and outcome: none        Today's PHQ-2 Score:   PHQ-2 ( 1999 Pfizer) 8/18/2020 6/18/2018   Q1: Little interest or pleasure in doing things 0 0   Q2: Feeling down, depressed or  hopeless 0 0   PHQ-2 Score 0 0       Abuse: Current or Past(Physical, Sexual or Emotional)- No  Do you feel safe in your environment? Yes        Social History     Tobacco Use     Smoking status: Never Smoker     Smokeless tobacco: Never Used   Substance Use Topics     Alcohol use: Yes     Comment: social use     If you drink alcohol do you typically have >3 drinks per day or >7 drinks per week? No                      Last PSA:   PSA   Date Value Ref Range Status   04/15/2019 0.90 0 - 4 ug/L Final     Comment:     Assay Method:  Chemiluminescence using Siemens Vista analyzer       Reviewed orders with patient. Reviewed health maintenance and updated orders accordingly - Yes  Lab work is in process  Labs reviewed in EPIC  BP Readings from Last 3 Encounters:   08/18/20 116/79   04/15/19 (!) 126/98   07/26/18 114/78    Wt Readings from Last 3 Encounters:   08/18/20 72.8 kg (160 lb 6.4 oz)   04/15/19 72.6 kg (160 lb)   07/26/18 74.3 kg (163 lb 12.8 oz)                  Patient Active Problem List   Diagnosis     Hyperlipidemia LDL goal <160     History of hepatitis B     Benign essential hypertension     Moderate persistent asthma with exacerbation     Past Surgical History:   Procedure Laterality Date     ENT SURGERY  6-29-15    Removal of anterior neck mass- Dr. Willard     EXCISE MASS NECK Right 6/29/2015    Procedure: EXCISE MASS NECK;  Surgeon: Florentino Willard MD;  Location:  OR       Social History     Tobacco Use     Smoking status: Never Smoker     Smokeless tobacco: Never Used   Substance Use Topics     Alcohol use: Yes     Comment: social use     Family History   Problem Relation Age of Onset     Hypertension Mother      Heart Disease Mother      Hypertension Sister      Hypertension Sister          Current Outpatient Medications   Medication Sig Dispense Refill     acetaminophen (TYLENOL) 325 MG tablet Take 325-650 mg by mouth every 6 hours as needed for mild pain       albuterol (PROAIR RESPICLICK) 108  (90 Base) MCG/ACT inhaler Inhale 1-2 puffs into the lungs every 6 hours as needed for wheezing 1 Inhaler 11     losartan (COZAAR) 100 MG tablet TAKE 1 TABLET(100 MG) BY MOUTH DAILY 30 tablet 0     mometasone-formoterol (DULERA) 100-5 MCG/ACT inhaler Inhale 2 puffs into the lungs 2 times daily 1 Inhaler 5     montelukast (SINGULAIR) 10 MG tablet TAKE 1 TABLET(10 MG) BY MOUTH AT BEDTIME.  Due to be seen in April 2020 90 tablet 1     No Known Allergies  Recent Labs   Lab Test 04/15/19  1009 06/18/18  1012 03/15/18  1622 05/30/17  1655 04/02/16  1030   *  --  115*  --  127*   HDL 61  --  61  --  59   TRIG 93  --  132  --  96   ALT 52  --   --  37 57   CR 0.88 0.83  --  0.92 0.72   GFRESTIMATED >90 >90  --  87 >90  Non  GFR Calc     GFRESTBLACK >90 >90  --  >90   GFR Calc   >90   GFR Calc     POTASSIUM 4.6 3.9  --  4.1 3.8   TSH 0.72  --   --   --  1.10        Reviewed and updated as needed this visit by clinical staff  Tobacco  Allergies  Meds  Med Hx  Surg Hx  Fam Hx  Soc Hx        Reviewed and updated as needed this visit by Provider        Past Medical History:   Diagnosis Date     History of hepatitis B       Past Surgical History:   Procedure Laterality Date     ENT SURGERY  6-29-15    Removal of anterior neck mass- Dr. Willard     EXCISE MASS NECK Right 6/29/2015    Procedure: EXCISE MASS NECK;  Surgeon: Florentino Willard MD;  Location: [facility] OR       ROS:  CONSTITUTIONAL: NEGATIVE for fever, chills, change in weight  INTEGUMENTARY/SKIN: NEGATIVE for worrisome rashes, moles or lesions  EYES: NEGATIVE for vision changes or irritation  ENT: NEGATIVE for ear, mouth and throat problems  RESP: NEGATIVE for significant cough or SOB  CV: NEGATIVE for chest pain, palpitations or peripheral edema  GI: NEGATIVE for nausea, abdominal pain, heartburn, or change in bowel habits   male: negative for dysuria, hematuria, decreased urinary stream, erectile dysfunction,  "urethral discharge  MUSCULOSKELETAL: NEGATIVE for significant arthralgias or myalgia  NEURO: NEGATIVE for weakness, dizziness or paresthesias  PSYCHIATRIC: NEGATIVE for changes in mood or affect    OBJECTIVE:   /79   Pulse 69   Temp 97.9  F (36.6  C) (Tympanic)   Resp 16   Ht 1.655 m (5' 5.16\")   Wt 72.8 kg (160 lb 6.4 oz)   SpO2 97%   BMI 26.56 kg/m    EXAM:  GENERAL: healthy, alert and no distress  EYES: Eyes grossly normal to inspection, PERRL and conjunctivae and sclerae normal  HENT: ear canals and TM's normal, nose and mouth without ulcers or lesions  NECK: no adenopathy, no asymmetry, masses, or scars and thyroid normal to palpation  RESP: lungs clear to auscultation - no rales, rhonchi or wheezes  CV: regular rate and rhythm, normal S1 S2, no S3 or S4, no murmur, click or rub, no peripheral edema and peripheral pulses strong  ABDOMEN: soft, nontender, no hepatosplenomegaly, no masses and bowel sounds normal  MS: no gross musculoskeletal defects noted, no edema  SKIN: no suspicious lesions or rashes  NEURO: Normal strength and tone, mentation intact and speech normal  PSYCH: mentation appears normal, affect normal/bright  FEET: tenderness to the inferomedial aspect of the effected heel(s)at the insertion of the plantar fascia.    Diagnostic Test Results:  Labs reviewed in Epic    ASSESSMENT/PLAN:       ICD-10-CM    1. Routine general medical examination at a health care facility  Z00.00    2. Benign essential hypertension  I10 BASIC METABOLIC PANEL   3. Hyperlipidemia LDL goal <160  E78.5 Lipid panel reflex to direct LDL Fasting   4. Screening PSA (prostate specific antigen)  Z12.5    5. Moderate persistent asthma with exacerbation  J45.41    6. Encounter for screening for HIV  Z11.4 HIV Screening   7. Colon cancer screening  Z12.11 GASTROENTEROLOGY ADULT REF PROCEDURE ONLY       COUNSELING:  Reviewed preventive health counseling, as reflected in patient instructions       Regular exercise       " "Healthy diet/nutrition    Estimated body mass index is 26.56 kg/m  as calculated from the following:    Height as of this encounter: 1.655 m (5' 5.16\").    Weight as of this encounter: 72.8 kg (160 lb 6.4 oz).    Weight management plan: Discussed healthy diet and exercise guidelines     reports that he has never smoked. He has never used smokeless tobacco.      Counseling Resources:  ATP IV Guidelines  Pooled Cohorts Equation Calculator  FRAX Risk Assessment  ICSI Preventive Guidelines  Dietary Guidelines for Americans, 2010  USDA's MyPlate  ASA Prophylaxis  Lung CA Screening    Tone Vanessa PA-C  [facility] [place]  "

## 2020-08-18 NOTE — PATIENT INSTRUCTIONS
Preventive Health Recommendations  Male Ages 50 - 64    Yearly exam:             See your health care provider every year in order to  o   Review health changes.   o   Discuss preventive care.    o   Review your medicines if your doctor has prescribed any.     Have a cholesterol test every 5 years, or more frequently if you are at risk for high cholesterol/heart disease.     Have a diabetes test (fasting glucose) every three years. If you are at risk for diabetes, you should have this test more often.     Have a colonoscopy at age 50, or have a yearly FIT test (stool test). These exams will check for colon cancer.      Talk with your health care provider about whether or not a prostate cancer screening test (PSA) is right for you.    You should be tested each year for STDs (sexually transmitted diseases), if you re at risk.     Shots: Get a flu shot each year. Get a tetanus shot every 10 years.     Nutrition:    Eat at least 5 servings of fruits and vegetables daily.     Eat whole-grain bread, whole-wheat pasta and brown rice instead of white grains and rice.     Get adequate Calcium and Vitamin D.     Lifestyle    Exercise for at least 150 minutes a week (30 minutes a day, 5 days a week). This will help you control your weight and prevent disease.     Limit alcohol to one drink per day.     No smoking.     Wear sunscreen to prevent skin cancer.     See your dentist every six months for an exam and cleaning.     See your eye doctor every 1 to 2 years.        Treatment of your plantar fasciitis:    Arch support insoles in you shoes  Do not go barefoot for the next 6 weeks   Foot stretch

## 2020-08-18 NOTE — LETTER
My Asthma Action Plan    Name: Christ Gomes   YOB: 1970  Date: 8/18/2020   My doctor: Tone Vanessa PA-C   My clinic: [facility] [place]        My Control Medicine:   My Rescue Medicine:    My Asthma Severity:   Moderate Persistent  Know your asthma triggers:   Patient is unaware of triggers            GREEN ZONE   Good Control    I feel good    No cough or wheeze    Can work, sleep and play without asthma symptoms       Take your asthma control medicine every day.     1. If exercise triggers your asthma, take your rescue medication    15 minutes before exercise or sports, and    During exercise if you have asthma symptoms  2. Spacer to use with inhaler: If you have a spacer, make sure to use it with your inhaler             YELLOW ZONE Getting Worse  I have ANY of these:    I do not feel good    Cough or wheeze    Chest feels tight    Wake up at night   1. Keep taking your Green Zone medications  2. Start taking your rescue medicine:    every 20 minutes for up to 1 hour. Then every 4 hours for 24-48 hours.  3. If you stay in the Yellow Zone for more than 12-24 hours, contact your doctor.  4. If you do not return to the Green Zone in 12-24 hours or you get worse, start taking your oral steroid medicine if prescribed by your provider.           RED ZONE Medical Alert - Get Help  I have ANY of these:    I feel awful    Medicine is not helping    Breathing getting harder    Trouble walking or talking    Nose opens wide to breathe       1. Take your rescue medicine NOW  2. If your provider has prescribed an oral steroid medicine, start taking it NOW  3. Call your doctor NOW  4. If you are still in the Red Zone after 20 minutes and you have not reached your doctor:    Take your rescue medicine again and    Call 911 or go to the emergency room right away    See your regular doctor within 2 weeks of an Emergency Room or Urgent Care visit for follow-up treatment.          Annual Reminders:  Meet with  Asthma Educator,  Flu Shot in the Fall, consider Pneumonia Vaccination for patients with asthma (aged 19 and older).    Pharmacy: [id number] - [place] - [address] AT [facility] [address]    Electronically signed by Tone Vanessa PA-C   Date: 08/18/20                      Asthma Triggers  How To Control Things That Make Your Asthma Worse    Triggers are things that make your asthma worse.  Look at the list below to help you find your triggers and what you can do about them.  You can help prevent asthma flare-ups by staying away from your triggers.      Trigger                                                          What you can do   Cigarette Smoke  Tobacco smoke can make asthma worse. Do not allow smoking in your home, car or around you.  Be sure no one smokes at a child s day care or school.  If you smoke, ask your health care provider for ways to help you quit.  Ask family members to quit too.  Ask your health care provider for a referral to Quit Plan to help you quit smoking, or call 5-130-765-PLAN.     Colds, Flu, Bronchitis  These are common triggers of asthma. Wash your hands often.  Don t touch your eyes, nose or mouth.  Get a flu shot every year.     Dust Mites  These are tiny bugs that live in cloth or carpet. They are too small to see. Wash sheets and blankets in hot water every week.   Encase pillows and mattress in dust mite proof covers.  Avoid having carpet if you can. If you have carpet, vacuum weekly.   Use a dust mask and HEPA vacuum.   Pollen and Outdoor Mold  Some people are allergic to trees, grass, or weed pollen, or molds. Try to keep your windows closed.  Limit time out doors when pollen count is high.   Ask you health care provider about taking medicine during allergy season.     Animal Dander  Some people are allergic to skin flakes, urine or saliva from pets with fur or feathers. Keep pets with fur or feathers out of your home.    If you can t  keep the pet outdoors, then keep the pet out of your bedroom.  Keep the bedroom door closed.  Keep pets off cloth furniture and away from stuffed toys.     Mice, Rats, and Cockroaches   Some people are allergic to the waste from these pests.   Cover food and garbage.  Clean up spills and food crumbs.  Store grease in the refrigerator.   Keep food out of the bedroom.   Indoor Mold  This can be a trigger if your home has high moisture. Fix leaking faucets, pipes, or other sources of water.   Clean moldy surfaces.  Dehumidify basement if it is damp and smelly.   Smoke, Strong Odors, and Sprays  These can reduce air quality. Stay away from strong odors and sprays, such as perfume, powder, hair spray, paints, smoke incense, paint, cleaning products, candles and new carpet.   Exercise or Sports  Some people with asthma have this trigger. Be active!  Ask your doctor about taking medicine before sports or exercise to prevent symptoms.    Warm up for 5-10 minutes before and after sports or exercise.     Other Triggers of Asthma  Cold air:  Cover your nose and mouth with a scarf.  Sometimes laughing or crying can be a trigger.  Some medicines and food can trigger asthma.

## 2020-08-19 LAB — HIV 1+2 AB+HIV1 P24 AG SERPL QL IA: NONREACTIVE

## 2020-08-19 ASSESSMENT — ASTHMA QUESTIONNAIRES: ACT_TOTALSCORE: 20

## 2020-11-13 ENCOUNTER — HOSPITAL ENCOUNTER (OUTPATIENT)
Facility: AMBULATORY SURGERY CENTER | Age: 50
End: 2020-11-13
Attending: SURGERY | Admitting: SURGERY
Payer: COMMERCIAL

## 2020-11-13 DIAGNOSIS — Z11.59 ENCOUNTER FOR SCREENING FOR OTHER VIRAL DISEASES: Primary | ICD-10-CM

## 2020-11-24 ENCOUNTER — TELEPHONE (OUTPATIENT)
Dept: FAMILY MEDICINE | Facility: CLINIC | Age: 50
End: 2020-11-24

## 2020-11-24 ENCOUNTER — VIRTUAL VISIT (OUTPATIENT)
Dept: FAMILY MEDICINE | Facility: CLINIC | Age: 50
End: 2020-11-24
Payer: COMMERCIAL

## 2020-11-24 DIAGNOSIS — G44.209 TENSION HEADACHE: ICD-10-CM

## 2020-11-24 DIAGNOSIS — M54.2 CERVICALGIA: Primary | ICD-10-CM

## 2020-11-24 PROCEDURE — 99214 OFFICE O/P EST MOD 30 MIN: CPT | Mod: 95 | Performed by: PHYSICIAN ASSISTANT

## 2020-11-24 RX ORDER — CYCLOBENZAPRINE HCL 5 MG
5 TABLET ORAL 3 TIMES DAILY PRN
Qty: 30 TABLET | Refills: 1 | Status: SHIPPED | OUTPATIENT
Start: 2020-11-24 | End: 2021-12-14

## 2020-11-24 RX ORDER — PREDNISONE 20 MG/1
20 TABLET ORAL 2 TIMES DAILY
Qty: 14 TABLET | Refills: 0 | Status: SHIPPED | OUTPATIENT
Start: 2020-11-24 | End: 2020-12-01

## 2020-11-24 NOTE — TELEPHONE ENCOUNTER
Spoke with patient and he states he has pain to his left side of head, neck and shoulder.  Pain started 2 days ago.  Vision is fine.  No other symptoms.  Patient wanting to discuss with provider.  Telephone visit scheduled with PCP today.

## 2020-11-24 NOTE — TELEPHONE ENCOUNTER
Reason for Call:  Other call back    Detailed comments: patient is calling to discuss questions to personal matter, patient declined for details. Please call to discuss. Thank you.    Phone Number Patient can be reached at: Cell number on file:    Telephone Information:   Mobile [phone]       Best Time:     Can we leave a detailed message on this number? YES    Call taken on 11/24/2020 at 8:39 AM by Destiny Luz

## 2020-11-24 NOTE — PROGRESS NOTES
"Christ Gomes is a 50 year old male who is being evaluated via a billable telephone visit.      The patient has been notified of following:     \"This telephone visit will be conducted via a call between you and your physician/provider. We have found that certain health care needs can be provided without the need for a physical exam.  This service lets us provide the care you need with a short phone conversation.  If a prescription is necessary we can send it directly to your pharmacy.  If lab work is needed we can place an order for that and you can then stop by our lab to have the test done at a later time.    Telephone visits are billed at different rates depending on your insurance coverage. During this emergency period, for some insurers they may be billed the same as an in-person visit.  Please reach out to your insurance provider with any questions.    If during the course of the call the physician/provider feels a telephone visit is not appropriate, you will not be charged for this service.\"    Patient has given verbal consent for Telephone visit?  No    What phone number would you like to be contacted at? [phone]    How would you like to obtain your AVS? Mail a copy    Subjective     Christ Gomes is a 50 year old male who presents via phone visit today for the following health issues:    HPI     Headache  Onset/Duration: 2 days   Description  Location: Left side from head to neck  No obvious injury.  Tylenol helped some.   Mild neck pain with lateral rotation.   No blurred vision . No congestion or cough or fever.   No paresthesias or paresis. No hearing changes. No dizziness.  Character: throbbing pain  Frequency:  Daily   Wake with headaches: YES  Able to do daily activities when headache present: no   Intensity:  moderate  Progression of Symptoms:  intermittent  Accompanying signs and symptoms:  Stiff neck: YES  Neck or upper back pain: YES  Sinus or URI symptoms no   Fever: no  Nausea or vomiting: " YES  Dizziness: no  Numbness/tingling: no  Weakness: no  Visual changes: none  History  Head trauma: no  Family history of migraines: no  Daily pain medication use: YES- Advil  Previous tests for headaches: no  Neurologist evaluation: no    Therapies tried and outcome: Ibuprofen (Advil, Motrin) and Tylenol              Outcome - usually effective          Review of Systems   Constitutional, HEENT, cardiovascular, pulmonary, GI, , musculoskeletal, neuro, skin, endocrine and psych systems are negative, except as otherwise noted.       Objective          Vitals:  No vitals were obtained today due to virtual visit.    healthy, alert and no distress  PSYCH: Alert and oriented times 3; coherent speech, normal   rate and volume, able to articulate logical thoughts, able   to abstract reason, no tangential thoughts, no hallucinations   or delusions  His affect is normal  RESP: No cough, no audible wheezing, able to talk in full sentences  Remainder of exam unable to be completed due to telephone visits            Assessment/Plan:    Christ was seen today for headache.    Diagnoses and all orders for this visit:    Cervicalgia  -     predniSONE (DELTASONE) 20 MG tablet; Take 1 tablet (20 mg) by mouth 2 times daily for 7 days  -     cyclobenzaprine (FLEXERIL) 5 MG tablet; Take 1 tablet (5 mg) by mouth 3 times daily as needed for muscle spasms    Tension headache      work on lifestyle modification  Advised supportive and symptomatic treatment.  Follow up with Provider - if condition persists or worsens.       Phone call duration:  8 minutes

## 2021-05-10 ENCOUNTER — TELEPHONE (OUTPATIENT)
Dept: FAMILY MEDICINE | Facility: CLINIC | Age: 51
End: 2021-05-10

## 2021-05-10 NOTE — TELEPHONE ENCOUNTER
Notified patient that he has refills at the pharmacy.  It looks like he was due in April for a med check.  Scheduled appointment with PCP.    Patient stated understanding and agreeable with the plan of care.   Samaria GRIFFIN-RN.  [facility]

## 2021-05-10 NOTE — TELEPHONE ENCOUNTER
Patient requesting refill of albuterol (PROAIR RESPICLICK) 108 (90 Base) MCG/ACT inhaler, montelukast (SINGULAIR) 10 MG tablet. Patient uses [facility].

## 2021-05-11 ENCOUNTER — OFFICE VISIT (OUTPATIENT)
Dept: FAMILY MEDICINE | Facility: CLINIC | Age: 51
End: 2021-05-11
Payer: COMMERCIAL

## 2021-05-11 ENCOUNTER — TELEPHONE (OUTPATIENT)
Dept: NURSING | Facility: CLINIC | Age: 51
End: 2021-05-11

## 2021-05-11 VITALS
WEIGHT: 159.8 LBS | HEART RATE: 83 BPM | SYSTOLIC BLOOD PRESSURE: 123 MMHG | RESPIRATION RATE: 20 BRPM | TEMPERATURE: 96.8 F | DIASTOLIC BLOOD PRESSURE: 79 MMHG | OXYGEN SATURATION: 97 % | BODY MASS INDEX: 26.46 KG/M2

## 2021-05-11 DIAGNOSIS — Z11.52 ENCOUNTER FOR SCREENING FOR COVID-19: ICD-10-CM

## 2021-05-11 DIAGNOSIS — I10 BENIGN ESSENTIAL HYPERTENSION: Primary | ICD-10-CM

## 2021-05-11 DIAGNOSIS — J30.1 CHRONIC SEASONAL ALLERGIC RHINITIS DUE TO POLLEN: ICD-10-CM

## 2021-05-11 DIAGNOSIS — J45.41 MODERATE PERSISTENT ASTHMA WITH EXACERBATION: ICD-10-CM

## 2021-05-11 PROCEDURE — 99214 OFFICE O/P EST MOD 30 MIN: CPT | Performed by: PHYSICIAN ASSISTANT

## 2021-05-11 RX ORDER — MONTELUKAST SODIUM 10 MG/1
TABLET ORAL
Qty: 90 TABLET | Refills: 1 | Status: SHIPPED | OUTPATIENT
Start: 2021-05-11 | End: 2021-12-14

## 2021-05-11 RX ORDER — ALBUTEROL SULFATE 90 UG/1
1-2 POWDER, METERED RESPIRATORY (INHALATION) EVERY 6 HOURS PRN
Qty: 1 EACH | Refills: 4 | Status: SHIPPED | OUTPATIENT
Start: 2021-05-11 | End: 2022-06-22

## 2021-05-11 RX ORDER — LOSARTAN POTASSIUM 100 MG/1
100 TABLET ORAL DAILY
Qty: 90 TABLET | Refills: 1 | Status: SHIPPED | OUTPATIENT
Start: 2021-05-11 | End: 2021-12-14

## 2021-05-11 NOTE — PROGRESS NOTES
Sher Polo is a 51 year old who presents for the following health issues HPI     Hypertension Follow-up      Do you check your blood pressure regularly outside of the clinic? Yes     Are you following a low salt diet? No    Are your blood pressures ever more than 140 on the top number (systolic) OR more   than 90 on the bottom number (diastolic), for example 140/90? Yes  Home blood pressure numbers run high normal at times.  No palpitations/dizziness/ha's. No exertional chest pain   Some occasional sob. Occasional wheezing.   Asthma Follow-Up  Not using his dulera for the past several months.   Was ACT completed today?    Yes    ACT Total Scores 5/11/2021   ACT TOTAL SCORE (Goal Greater than or Equal to 20) 16   In the past 12 months, how many times did you visit the emergency room for your asthma without being admitted to the hospital? 0   In the past 12 months, how many times were you hospitalized overnight because of your asthma? 0       How many days per week do you miss taking your asthma controller medication?  7 - not taking Dulera    Please describe any recent triggers for your asthma: unsure    Have you had any Emergency Room Visits, Urgent Care Visits, or Hospital Admissions since your last office visit?  No      How many servings of fruits and vegetables do you eat daily?  0-1    On average, how many sweetened beverages do you drink each day (Examples: soda, juice, sweet tea, etc.  Do NOT count diet or artificially sweetened beverages)?   0    How many days per week do you exercise enough to make your heart beat faster? 3 or less    How many minutes a day do you exercise enough to make your heart beat faster? 9 or less  How many days per week do you miss taking your medication? None    Has Questions about sleep/and melatonin        Review of Systems   Constitutional, HEENT, cardiovascular, pulmonary, GI, , musculoskeletal, neuro, skin, endocrine and psych systems are negative, except as  otherwise noted.      Objective    /79   Pulse 83   Temp 96.8  F (36  C) (Tympanic)   Resp 20   Wt 72.5 kg (159 lb 12.8 oz)   SpO2 97%   BMI 26.46 kg/m    Body mass index is 26.46 kg/m .  Physical Exam   Eye exam - right eye normal lid, conjunctiva, cornea, pupil and fundus, left eye normal lid, conjunctiva, cornea, pupil and fundus.  CHEST:chest clear to IPPA, no tachypnea, retractions or cyanosis and S1, S2 normal, no murmur, no gallop, rate regular.  Thyroid not palpable, not enlarged, no nodules detected.  No edema    Christ was seen today for asthma and hypertension.    Diagnoses and all orders for this visit:    Benign essential hypertension  -     losartan (COZAAR) 100 MG tablet; Take 1 tablet (100 mg) by mouth daily    Moderate persistent asthma with exacerbation  -     mometasone-formoterol (DULERA) 100-5 MCG/ACT inhaler; Inhale 2 puffs into the lungs 2 times daily  -     albuterol (PROAIR RESPICLICK) 108 (90 Base) MCG/ACT inhaler; Inhale 1-2 puffs into the lungs every 6 hours as needed for wheezing  -     montelukast (SINGULAIR) 10 MG tablet; TAKE 1 TABLET(10 MG) BY MOUTH AT BEDTIME.  Due to be seen in April 2020    Chronic seasonal allergic rhinitis due to pollen  -     montelukast (SINGULAIR) 10 MG tablet; TAKE 1 TABLET(10 MG) BY MOUTH AT BEDTIME.  Due to be seen in April 2020    Encounter for screening for COVID-19  -     Asymptomatic COVID-19 Virus (Coronavirus) by PCR; Future      Restart dulera and singulair.  Advised supportive and symptomatic treatment.  Follow up with Provider - if condition persists or worsens.   work on lifestyle modification

## 2021-05-11 NOTE — TELEPHONE ENCOUNTER
"Data:   Pt requesting COVID test for himself, wife and child for traveling. Pt reports no symptoms.      Action:   Transferred to  to set up appt for testing.      Response:   Pt verbalizes understanding and agrees with plan of care       Joseph Medel RN 5/11/2021 2:04 PM  [facility] Nurse Advisor    COVID 19 Nurse Triage Plan/Patient Instructions    Please be aware that novel coronavirus (COVID-19) may be circulating in the community. If you develop symptoms such as fever, cough, or SOB or if you have concerns about the presence of another infection including coronavirus (COVID-19), please contact your health care provider or visit [email or web address].     Disposition/Instructions    Additional COVID19 information to add for patients.   How can I protect others?  If you have symptoms (fever, cough, body aches or trouble breathing): Stay home and away from others (self-isolate) until:    At least 10 days have passed since your symptoms started, And     You ve had no fever--and no medicine that reduces fever--for 1 full day (24 hours), And      Your other symptoms have resolved (gotten better).     If you don t have symptoms, but a test showed that you have COVID-19 (you tested positive):    Stay home and away from others (self-isolate). Follow the tips under \"How do I self-isolate?\" below for 10 days (20 days if you have a weak immune system).    You don't need to be retested for COVID-19 before going back to school or work. As long as you're fever-free and feeling better, you can go back to school, work and other activities after waiting the 10 or 20 days.     How do I self-isolate?    Stay in your own room, even for meals. Use your own bathroom if you can.     Stay away from others in your home. No hugging, kissing or shaking hands. No visitors.    Don t go to work, school or anywhere else.     Clean  high touch  surfaces often (doorknobs, counters, handles, etc.). Use a household cleaning " spray or wipes. You ll find a full list on the EPA website:  www.epa.gov/pesticide-registration/list-n-disinfectants-use-against-sars-cov-2.    Cover your mouth and nose with a mask, tissue or washcloth to avoid spreading germs.    Wash your hands and face often. Use soap and water.    Caregivers in these groups are at risk for severe illness due to COVID-19:  o People 65 years and older  o People who live in a nursing home or long-term care facility  o People with chronic disease (lung, heart, cancer, diabetes, kidney, liver, immunologic)  o People who have a weakened immune system, including those who:  - Are in cancer treatment  - Take medicine that weakens the immune system, such as corticosteroids  - Had a bone marrow or organ transplant  - Have an immune deficiency  - Have poorly controlled HIV or AIDS  - Are obese (body mass index of 40 or higher)  - Smoke regularly    Caregivers should wear gloves while washing dishes, handling laundry and cleaning bedrooms and bathrooms.    Use caution when washing and drying laundry: Don t shake dirty laundry, and use the warmest water setting that you can.    For more tips, go to www.cdc.gov/coronavirus/2019-ncov/downloads/10Things.pdf.    How can I take care of myself?  1. Get lots of rest. Drink extra fluids (unless a doctor has told you not to).     2. Take Tylenol (acetaminophen) for fever or pain. If you have liver or kidney problems, ask your family doctor if it s okay to take Tylenol.     Adults can take either:     650 mg (two 325 mg pills) every 4 to 6 hours, or     1,000 mg (two 500 mg pills) every 8 hours as needed.     Note: Don t take more than 3,000 mg in one day.   Acetaminophen is found in many medicines (both prescribed and over-the-counter medicines). Read all labels to be sure you don t take too much.     For children, check the Tylenol bottle for the right dose. The dose is based on the child s age or weight.    3. If you have other health problems  (like cancer, heart failure, an organ transplant or severe kidney disease): Call your specialty clinic if you don t feel better in the next 2 days.    4. Know when to call 911: Emergency warning signs include:    Trouble breathing or shortness of breath    Pain or pressure in the chest that doesn t go away    Feeling confused like you haven t felt before, or not being able to wake up    Bluish-colored lips or face    What are the symptoms of COVID-19?     The most common symptoms are cough, fever and trouble breathing.     Less common symptoms include body aches, chills, diarrhea (loose, watery poops), fatigue (feeling very tired), headache, runny nose, sore throat and loss of smell.    COVID-19 can cause severe coughing (bronchitis) and lung infection (pneumonia).    How does it spread?     The virus may spread when a person coughs or sneezes into the air. The virus can travel about 6 feet this way, and it can live on surfaces.      Common  (household disinfectants) will kill the virus.    Who is at risk?  Anyone can catch COVID-19 if they re around someone who has the virus.    How can others protect themselves?     Stay away from people who have COVID-19 (or symptoms of COVID-19).    Wash hands often with soap and water. Or, use hand  with at least 60% alcohol.    Avoid touching the eyes, nose or mouth.     Wear a face mask when you go out in public, when sick or when caring for a sick person.    Where can I get more information?    [facility]: About COVID-19: [email or web address]    CDC: What to Do If You re Sick: www.cdc.gov/coronavirus/2019-ncov/about/steps-when-sick.html    CDC: Ending Home Isolation: www.cdc.gov/coronavirus/2019-ncov/hcp/disposition-in-home-patients.html     CDC: Caring for Someone: www.cdc.gov/coronavirus/2019-ncov/if-you-are-sick/care-for-someone.html     MD: Interim Guidance for Hospital Discharge to Home:  [email or web address]    [facility] clinical trials (COVID-19 research studies): [email or web address]-clinical-trials     Below are the COVID-19 hotlines at the [place] Department of [facility] ([facility]). Interpreters are available.   o For health questions: Call 867-069-0491 or 1-808.341.7960 (7 a.m. to 7 p.m.)  o For questions about schools and childcare: Call 875-178-1512 or 1-834.792.8575 (7 a.m. to 7 p.m.)          Thank you for taking steps to prevent the spread of this virus.  o Limit your contact with others.  o Wear a simple mask to cover your cough.  o Wash your hands well and often.    Resources    [facility]: About COVID-19: [email or web address]    CDC: What to Do If You're Sick: www.cdc.gov/coronavirus/2019-ncov/about/steps-when-sick.html    CDC: Ending Home Isolation: www.cdc.gov/coronavirus/2019-ncov/hcp/disposition-in-home-patients.html     CDC: Caring for Someone: www.cdc.gov/coronavirus/2019-ncov/if-you-are-sick/care-for-someone.html     [facility]: Interim Guidance for Hospital Discharge to Home: [email or web address]/[email or web address].pdf    [facility] clinical trials (COVID-19 research studies): [email or web address]-clinical-trials     Below are the COVID-19 hotlines at the [place] [facility] ([facility]). Interpreters are available.   o For health questions: Call 712-953-6639 or 1-772.565.1898 (7 a.m. to 7 p.m.)  o For questions about schools and childcare: Call 294-417-1662 or 1-173.728.1022 (7 a.m. to 7 p.m.)

## 2021-05-12 ASSESSMENT — ASTHMA QUESTIONNAIRES: ACT_TOTALSCORE: 16

## 2021-05-15 DIAGNOSIS — Z11.52 ENCOUNTER FOR SCREENING FOR COVID-19: ICD-10-CM

## 2021-11-22 ENCOUNTER — TRANSFERRED RECORDS (OUTPATIENT)
Dept: HEALTH INFORMATION MANAGEMENT | Facility: CLINIC | Age: 51
End: 2021-11-22
Payer: COMMERCIAL

## 2021-12-14 ENCOUNTER — OFFICE VISIT (OUTPATIENT)
Dept: FAMILY MEDICINE | Facility: CLINIC | Age: 51
End: 2021-12-14
Payer: COMMERCIAL

## 2021-12-14 VITALS
OXYGEN SATURATION: 99 % | DIASTOLIC BLOOD PRESSURE: 89 MMHG | TEMPERATURE: 98.2 F | SYSTOLIC BLOOD PRESSURE: 122 MMHG | HEIGHT: 65 IN | HEART RATE: 78 BPM | RESPIRATION RATE: 20 BRPM | BODY MASS INDEX: 26.3 KG/M2 | WEIGHT: 157.85 LBS

## 2021-12-14 DIAGNOSIS — K04.7 TOOTH ABSCESS: ICD-10-CM

## 2021-12-14 DIAGNOSIS — J45.41 MODERATE PERSISTENT ASTHMA WITH EXACERBATION: ICD-10-CM

## 2021-12-14 DIAGNOSIS — R53.83 FATIGUE, UNSPECIFIED TYPE: ICD-10-CM

## 2021-12-14 DIAGNOSIS — J30.1 CHRONIC SEASONAL ALLERGIC RHINITIS DUE TO POLLEN: ICD-10-CM

## 2021-12-14 DIAGNOSIS — I10 BENIGN ESSENTIAL HYPERTENSION: ICD-10-CM

## 2021-12-14 DIAGNOSIS — Z00.00 ROUTINE GENERAL MEDICAL EXAMINATION AT A HEALTH CARE FACILITY: Primary | ICD-10-CM

## 2021-12-14 DIAGNOSIS — Z12.5 SCREENING PSA (PROSTATE SPECIFIC ANTIGEN): ICD-10-CM

## 2021-12-14 DIAGNOSIS — E78.5 HYPERLIPIDEMIA LDL GOAL <160: ICD-10-CM

## 2021-12-14 DIAGNOSIS — Z12.11 COLON CANCER SCREENING: ICD-10-CM

## 2021-12-14 LAB
ANION GAP SERPL CALCULATED.3IONS-SCNC: 2 MMOL/L (ref 3–14)
BASOPHILS # BLD AUTO: 0.1 10E3/UL (ref 0–0.2)
BASOPHILS NFR BLD AUTO: 1 %
BUN SERPL-MCNC: 14 MG/DL (ref 7–30)
CALCIUM SERPL-MCNC: 9.2 MG/DL (ref 8.5–10.1)
CHLORIDE BLD-SCNC: 106 MMOL/L (ref 94–109)
CHOLEST SERPL-MCNC: 190 MG/DL
CO2 SERPL-SCNC: 30 MMOL/L (ref 20–32)
CREAT SERPL-MCNC: 0.73 MG/DL (ref 0.66–1.25)
EOSINOPHIL # BLD AUTO: 0.4 10E3/UL (ref 0–0.7)
EOSINOPHIL NFR BLD AUTO: 4 %
ERYTHROCYTE [DISTWIDTH] IN BLOOD BY AUTOMATED COUNT: 12.7 % (ref 10–15)
FASTING STATUS PATIENT QL REPORTED: YES
GFR SERPL CREATININE-BSD FRML MDRD: >90 ML/MIN/1.73M2
GLUCOSE BLD-MCNC: 103 MG/DL (ref 70–99)
HCT VFR BLD AUTO: 43.1 % (ref 40–53)
HDLC SERPL-MCNC: 63 MG/DL
HGB BLD-MCNC: 15 G/DL (ref 13.3–17.7)
LDLC SERPL CALC-MCNC: 113 MG/DL
LYMPHOCYTES # BLD AUTO: 2 10E3/UL (ref 0.8–5.3)
LYMPHOCYTES NFR BLD AUTO: 19 %
MCH RBC QN AUTO: 29.2 PG (ref 26.5–33)
MCHC RBC AUTO-ENTMCNC: 34.8 G/DL (ref 31.5–36.5)
MCV RBC AUTO: 84 FL (ref 78–100)
MONOCYTES # BLD AUTO: 0.6 10E3/UL (ref 0–1.3)
MONOCYTES NFR BLD AUTO: 5 %
NEUTROPHILS # BLD AUTO: 7.6 10E3/UL (ref 1.6–8.3)
NEUTROPHILS NFR BLD AUTO: 71 %
NONHDLC SERPL-MCNC: 127 MG/DL
PLATELET # BLD AUTO: 221 10E3/UL (ref 150–450)
POTASSIUM BLD-SCNC: 4.9 MMOL/L (ref 3.4–5.3)
PSA SERPL-MCNC: 0.87 UG/L (ref 0–4)
RBC # BLD AUTO: 5.13 10E6/UL (ref 4.4–5.9)
SODIUM SERPL-SCNC: 138 MMOL/L (ref 133–144)
TRIGL SERPL-MCNC: 69 MG/DL
TSH SERPL DL<=0.005 MIU/L-ACNC: 1.84 MU/L (ref 0.4–4)
WBC # BLD AUTO: 10.6 10E3/UL (ref 4–11)

## 2021-12-14 PROCEDURE — 80048 BASIC METABOLIC PNL TOTAL CA: CPT | Performed by: PHYSICIAN ASSISTANT

## 2021-12-14 PROCEDURE — 36415 COLL VENOUS BLD VENIPUNCTURE: CPT | Performed by: PHYSICIAN ASSISTANT

## 2021-12-14 PROCEDURE — 99213 OFFICE O/P EST LOW 20 MIN: CPT | Mod: 25 | Performed by: PHYSICIAN ASSISTANT

## 2021-12-14 PROCEDURE — 85025 COMPLETE CBC W/AUTO DIFF WBC: CPT | Performed by: PHYSICIAN ASSISTANT

## 2021-12-14 PROCEDURE — G0103 PSA SCREENING: HCPCS | Performed by: PHYSICIAN ASSISTANT

## 2021-12-14 PROCEDURE — 84443 ASSAY THYROID STIM HORMONE: CPT | Performed by: PHYSICIAN ASSISTANT

## 2021-12-14 PROCEDURE — 80061 LIPID PANEL: CPT | Performed by: PHYSICIAN ASSISTANT

## 2021-12-14 PROCEDURE — 99396 PREV VISIT EST AGE 40-64: CPT | Performed by: PHYSICIAN ASSISTANT

## 2021-12-14 RX ORDER — DICLOFENAC SODIUM 75 MG/1
75 TABLET, DELAYED RELEASE ORAL 2 TIMES DAILY
Qty: 30 TABLET | Refills: 0 | Status: SHIPPED | OUTPATIENT
Start: 2021-12-14 | End: 2023-07-31

## 2021-12-14 RX ORDER — MONTELUKAST SODIUM 10 MG/1
TABLET ORAL
Qty: 90 TABLET | Refills: 1 | Status: SHIPPED | OUTPATIENT
Start: 2021-12-14 | End: 2022-06-22

## 2021-12-14 RX ORDER — AMOXICILLIN 875 MG
875 TABLET ORAL 2 TIMES DAILY
Qty: 20 TABLET | Refills: 0 | Status: SHIPPED | OUTPATIENT
Start: 2021-12-14 | End: 2021-12-24

## 2021-12-14 RX ORDER — LOSARTAN POTASSIUM 100 MG/1
100 TABLET ORAL DAILY
Qty: 90 TABLET | Refills: 1 | Status: SHIPPED | OUTPATIENT
Start: 2021-12-14 | End: 2022-06-22

## 2021-12-14 RX ORDER — AMLODIPINE BESYLATE 2.5 MG/1
2.5 TABLET ORAL DAILY
Qty: 60 TABLET | Refills: 0 | Status: SHIPPED | OUTPATIENT
Start: 2021-12-14 | End: 2022-02-11

## 2021-12-14 ASSESSMENT — ENCOUNTER SYMPTOMS
NERVOUS/ANXIOUS: 1
HEMATOCHEZIA: 0
CONSTIPATION: 0
PARESTHESIAS: 0
ABDOMINAL PAIN: 0
DYSURIA: 0
JOINT SWELLING: 0
PALPITATIONS: 0
COUGH: 0
DIZZINESS: 0
HEADACHES: 0
FEVER: 0
SORE THROAT: 0
ARTHRALGIAS: 1
MYALGIAS: 0
DIARRHEA: 0
EYE PAIN: 0
CHILLS: 1
HEMATURIA: 0
FREQUENCY: 0
NAUSEA: 0
HEARTBURN: 0
WEAKNESS: 1
SHORTNESS OF BREATH: 1

## 2021-12-14 ASSESSMENT — PAIN SCALES - GENERAL: PAINLEVEL: EXTREME PAIN (8)

## 2021-12-14 ASSESSMENT — MIFFLIN-ST. JEOR: SCORE: 1487.88

## 2021-12-14 NOTE — PROGRESS NOTES
SUBJECTIVE:   CC: Christ Gomes is an 51 year old male who presents for preventative health visit.       Patient has been advised of split billing requirements and indicates understanding: Yes  Healthy Habits:     Getting at least 3 servings of Calcium per day:  Yes    Bi-annual eye exam:  Yes    Dental care twice a year:  NO    Sleep apnea or symptoms of sleep apnea:  Daytime drowsiness and Sleep apnea    Diet:  Low salt, Low fat/cholesterol and Carbohydrate counting    Frequency of exercise:  None    Taking medications regularly:  Yes    Medication side effects:  None    PHQ-2 Total Score: 0    Additional concerns today:  Yes      Left lower molar/tooth pain. Some lower jaw swelling. He will be seeing his dentist later this week.    Fatigue, tired x a few months  No blood in stools. No snoring. No insomnia. Mild weight gain. No constipation or diarrhea. No dizziness.  No chest pain/sob/palpitations/dizziness/ha's    Discuss cracking fingernails, dry skin - what lotion to use    Today's PHQ-2 Score:   PHQ-2 ( 1999 Pfizer) 12/14/2021   Q1: Little interest or pleasure in doing things 0   Q2: Feeling down, depressed or hopeless 0   PHQ-2 Score 0   PHQ-2 Total Score (12-17 Years)- Positive if 3 or more points; Administer PHQ-A if positive -   Q1: Little interest or pleasure in doing things Not at all   Q2: Feeling down, depressed or hopeless Not at all   PHQ-2 Score 0       Abuse: Current or Past(Physical, Sexual or Emotional)- No  Do you feel safe in your environment? Yes    Have you ever done Advance Care Planning? (For example, a Health Directive, POLST, or a discussion with a medical provider or your loved ones about your wishes): No, advance care planning information given to patient to review.  Patient declined advance care planning discussion at this time.    Social History     Tobacco Use     Smoking status: Never Smoker     Smokeless tobacco: Never Used   Substance Use Topics     Alcohol use: Yes     Comment:  social use         Alcohol Use 12/14/2021   Prescreen: >3 drinks/day or >7 drinks/week? No   Prescreen: >3 drinks/day or >7 drinks/week? -       Last PSA:   PSA   Date Value Ref Range Status   08/18/2020 1.08 0 - 4 ug/L Final     Comment:     Assay Method:  Chemiluminescence using Siemens Vista analyzer       Reviewed orders with patient. Reviewed health maintenance and updated orders accordingly - Yes  Lab work is in process  Labs reviewed in EPIC  BP Readings from Last 3 Encounters:   12/14/21 122/89   05/11/21 123/79   08/18/20 116/79    Wt Readings from Last 3 Encounters:   12/14/21 71.6 kg (157 lb 13.6 oz)   05/11/21 72.5 kg (159 lb 12.8 oz)   08/18/20 72.8 kg (160 lb 6.4 oz)                  Patient Active Problem List   Diagnosis     Hyperlipidemia LDL goal <160     History of hepatitis B     Benign essential hypertension     Moderate persistent asthma with exacerbation     Past Surgical History:   Procedure Laterality Date     ENT SURGERY  6-29-15    Removal of anterior neck mass- Dr. Willard     EXCISE MASS NECK Right 6/29/2015    Procedure: EXCISE MASS NECK;  Surgeon: Florentino Willard MD;  Location: [facility] OR       Social History     Tobacco Use     Smoking status: Never Smoker     Smokeless tobacco: Never Used   Substance Use Topics     Alcohol use: Yes     Comment: social use     Family History   Problem Relation Age of Onset     Hypertension Mother      Heart Disease Mother      Hypertension Sister      Hypertension Sister          Current Outpatient Medications   Medication Sig Dispense Refill     acetaminophen (TYLENOL) 325 MG tablet Take 325-650 mg by mouth every 6 hours as needed for mild pain       albuterol (PROAIR RESPICLICK) 108 (90 Base) MCG/ACT inhaler Inhale 1-2 puffs into the lungs every 6 hours as needed for wheezing 1 each 4     amLODIPine (NORVASC) 2.5 MG tablet Take 1 tablet (2.5 mg) by mouth daily 60 tablet 0     amoxicillin (AMOXIL) 875 MG tablet Take 1 tablet (875 mg) by mouth 2 times  daily for 10 days 20 tablet 0     losartan (COZAAR) 100 MG tablet Take 1 tablet (100 mg) by mouth daily 90 tablet 1     mometasone-formoterol (DULERA) 100-5 MCG/ACT inhaler Inhale 2 puffs into the lungs 2 times daily 13 g 11     montelukast (SINGULAIR) 10 MG tablet TAKE 1 TABLET(10 MG) BY MOUTH AT BEDTIME.  Due to be seen in April 2020 90 tablet 1     cyclobenzaprine (FLEXERIL) 5 MG tablet Take 1 tablet (5 mg) by mouth 3 times daily as needed for muscle spasms (Patient not taking: Reported on 12/14/2021) 30 tablet 1     diclofenac (VOLTAREN) 75 MG EC tablet Take 1 tablet (75 mg) by mouth 2 times daily (Patient not taking: Reported on 5/11/2021) 30 tablet 0     No Known Allergies  Recent Labs   Lab Test 08/18/20  0833 04/15/19  1009 06/18/18  1012 03/15/18  1622 05/30/17  1655 04/02/16  1030   * 129*  --  115*  --  127*   HDL 63 61  --  61  --  59   TRIG 47 93  --  132  --  96   ALT  --  52  --   --  37 57   CR 0.80 0.88   < >  --  0.92 0.72   GFRESTIMATED >90 >90   < >  --  87 >90  Non  GFR Calc     GFRESTBLACK >90 >90   < >  --  >90   GFR Calc   >90   GFR Calc     POTASSIUM 4.1 4.6   < >  --  4.1 3.8   TSH  --  0.72  --   --   --  1.10    < > = values in this interval not displayed.        Reviewed and updated as needed this visit by clinical staff  Tobacco  Allergies  Meds   Med Hx  Surg Hx  Fam Hx  Soc Hx       Reviewed and updated as needed this visit by Provider               Past Medical History:   Diagnosis Date     History of hepatitis B       Past Surgical History:   Procedure Laterality Date     ENT SURGERY  6-29-15    Removal of anterior neck mass- Dr. Willard     EXCISE MASS NECK Right 6/29/2015    Procedure: EXCISE MASS NECK;  Surgeon: Florentnio Willard MD;  Location: [facility] OR       Review of Systems   Constitutional: Positive for chills. Negative for fever.   HENT: Negative for congestion, ear pain and sore throat.    Eyes: Negative for pain and  "visual disturbance.   Respiratory: Positive for shortness of breath. Negative for cough.    Cardiovascular: Negative for chest pain, palpitations and peripheral edema.   Gastrointestinal: Negative for abdominal pain, constipation, diarrhea, heartburn, hematochezia and nausea.   Genitourinary: Negative for dysuria, frequency, genital sores, hematuria, impotence, penile discharge and urgency.   Musculoskeletal: Positive for arthralgias. Negative for joint swelling and myalgias.   Skin: Negative for rash.   Neurological: Positive for weakness. Negative for dizziness, headaches and paresthesias.   Psychiatric/Behavioral: Negative for mood changes. The patient is nervous/anxious.      CONSTITUTIONAL: NEGATIVE for fever, chills, change in weight  INTEGUMENTARY/SKIN: NEGATIVE for worrisome rashes, moles or lesions  EYES: NEGATIVE for vision changes or irritation  ENT: NEGATIVE for ear, mouth and throat problems  RESP: NEGATIVE for significant cough or SOB  CV: NEGATIVE for chest pain, palpitations or peripheral edema  GI: NEGATIVE for nausea, abdominal pain, heartburn, or change in bowel habits   male: negative for dysuria, hematuria, decreased urinary stream, erectile dysfunction, urethral discharge  MUSCULOSKELETAL: NEGATIVE for significant arthralgias or myalgia  NEURO: NEGATIVE for weakness, dizziness or paresthesias  PSYCHIATRIC: NEGATIVE for changes in mood or affect    OBJECTIVE:   /89   Pulse 78   Temp 98.2  F (36.8  C) (Tympanic)   Resp 20   Ht 1.651 m (5' 5\")   Wt 71.6 kg (157 lb 13.6 oz)   SpO2 99%   BMI 26.27 kg/m      Physical Exam  GENERAL: healthy, alert and no distress  EYES: Eyes grossly normal to inspection, PERRL and conjunctivae and sclerae normal  HENT: ear canals and TM's normal, nose and mouth without ulcers or lesions  NECK: no adenopathy, no asymmetry, masses, or scars and thyroid normal to palpation  RESP: lungs clear to auscultation - no rales, rhonchi or wheezes  CV: regular rate " "and rhythm, normal S1 S2, no S3 or S4, no murmur, click or rub, no peripheral edema and peripheral pulses strong  ABDOMEN: soft, nontender, no hepatosplenomegaly, no masses and bowel sounds normal  MS: no gross musculoskeletal defects noted, no edema  SKIN: no suspicious lesions or rashes  NEURO: Normal strength and tone, mentation intact and speech normal  PSYCH: mentation appears normal, affect normal/bright    Diagnostic Test Results:  Labs reviewed in Epic    ASSESSMENT/PLAN:       ICD-10-CM    1. Routine general medical examination at a health care facility  Z00.00    2. Benign essential hypertension  I10 BASIC METABOLIC PANEL     amLODIPine (NORVASC) 2.5 MG tablet     losartan (COZAAR) 100 MG tablet   3. Fatigue, unspecified type  R53.83 TSH with free T4 reflex     CBC with platelets and differential   4. Hyperlipidemia LDL goal <160  E78.5 Lipid panel reflex to direct LDL Fasting   5. Screening PSA (prostate specific antigen)  Z12.5 PSA, screen   6. Colon cancer screening  Z12.11 Adult Gastro Ref - Procedure Only   7. Tooth abscess  K04.7 amoxicillin (AMOXIL) 875 MG tablet   8. Chronic seasonal allergic rhinitis due to pollen  J30.1 montelukast (SINGULAIR) 10 MG tablet   9. Moderate persistent asthma with exacerbation  J45.41 montelukast (SINGULAIR) 10 MG tablet     work on lifestyle modification  Recheck blood pressure in 2 mos   Patient has been advised of split billing requirements and indicates understanding: Yes  COUNSELING:   Reviewed preventive health counseling, as reflected in patient instructions       Regular exercise       Healthy diet/nutrition    Estimated body mass index is 26.27 kg/m  as calculated from the following:    Height as of this encounter: 1.651 m (5' 5\").    Weight as of this encounter: 71.6 kg (157 lb 13.6 oz).     Weight management plan: Discussed healthy diet and exercise guidelines    He reports that he has never smoked. He has never used smokeless tobacco.      Counseling " Resources:  ATP IV Guidelines  Pooled Cohorts Equation Calculator  FRAX Risk Assessment  ICSI Preventive Guidelines  Dietary Guidelines for Americans, 2010  USDA's MyPlate  ASA Prophylaxis  Lung CA Screening    [facility]

## 2022-01-06 ENCOUNTER — TELEPHONE (OUTPATIENT)
Dept: GASTROENTEROLOGY | Facility: CLINIC | Age: 52
End: 2022-01-06
Payer: COMMERCIAL

## 2022-01-10 ENCOUNTER — TELEPHONE (OUTPATIENT)
Dept: GASTROENTEROLOGY | Facility: CLINIC | Age: 52
End: 2022-01-10
Payer: COMMERCIAL

## 2022-01-10 NOTE — TELEPHONE ENCOUNTER
Screening Questions  1. Are you active on mychart? NO    2. What insurance is in the chart? [employer]      2.  Ordering/Referring Provider: Tone Vanessa PA-C    3. BMI 25.7, If greater than 40 review exclusion criteria also will need EXTENDED PREP    4.  Respiratory Screening (If yes to any of the following HOSPITAL setting only):     Do you use daily home oxygen?   Do you have mod to severe Obstructive Sleep Apnea?    Do you have Pulmonary Hypertension?    Do you have UNCONTROLLED asthma?     5. Have you had a heart or lung transplant?   (If yes, please review exclusion criteria)    6. Are you currently on dialysis?  (If yes, schedule in HOSPITAL setting only)(If yes, please send Golytely prep)    7. Do you have chronic kidney disease?  (If yes, please send Golytely prep)    7. Have you had a stroke or Transient ischemic attack (TIA) within 6 months?  (If yes, do not schedule at [facility])    8. In the past 6 months, have you had any heart related issues including cardiomyopathy or heart attack?  (If yes, please review exclusion criteria)           If yes, did it require cardiac stenting or other implantable device?  (If yes, please review exclusion criteria)      9. Do you have any implantable devices in your body (pacemaker, defib, LVAD)?  (If yes, schedule at [facility])    10. Do you take nitroglycerin? If yes, how often?  (if yes, schedule at HOSPITAL setting)    11. Are you currently taking any blood thinners? (If yes- inform patient to follow up with PCP or provider for follow up instructions)     12. Are you a diabetic?  (If yes, please send Golytely prep)    13. (Females) Are you currently pregnant?   If yes, how many weeks?      15. Are you taking any prescription pain medications on a routine schedule?  If yes, MAC sedation and patient will need EXTENDED PREP.    16. Do you have any chemical dependencies such as alcohol, street drugs, or methadone?  If yes, MAC sedation and patient will need EXTENDED PREP    17. Do  you have any history of post-traumatic stress syndrome, severe anxiety or history of psychosis?   If yes, MAC sedation.     18. Do you transfer independently?     19.  Do you have any issues with constipation?    If yes, pt will need EXTENDED PREP     20. Preferred Pharmacy for Pre Prescription     Scheduling Details    Which Colonoscopy Prep was Sent?:   Type of Procedure Scheduled:   Surgeon:   Date of Procedure:   Location:   Caller (Please ask for phone number if not scheduled by patient):       Sedation Type:   Conscious Sedation- Needs  for 6 hours after the procedure  MAC/General-Needs  for 24 hours after procedure    Pre-op Required at [facility] [place], [place] and OR for MAC sedation:   (if yes advise patient they will need a pre-op prior to procedure)      Informed patient they will need an adult    Cannot take any type of public or medical transportation alone    Pre-Procedure Covid test to be completed at [facility] or Externally:     Confirmed Nurse will call to complete assessment     Additional comments: PATIENT WAS AT WORK WHEN STARTED QUESTIONS HAD TO GO.  ([facility] PATIENTS NEED EXTENDED PREP)

## 2022-01-13 ENCOUNTER — TELEPHONE (OUTPATIENT)
Dept: GASTROENTEROLOGY | Facility: CLINIC | Age: 52
End: 2022-01-13
Payer: COMMERCIAL

## 2022-01-13 NOTE — TELEPHONE ENCOUNTER
Screening Questions  1. Are you active on mychart? NO    2. What insurance is in the chart? [employer]     2.  Ordering/Referring Provider: Tone Vanessa PA-C in  FAMILY PRACTICE    3. BMI 26.6, If greater than 40 review exclusion criteria also will need EXTENDED PREP    4.  Respiratory Screening (If yes to any of the following HOSPITAL setting only):     Do you use daily home oxygen? N  Do you have mod to severe Obstructive Sleep Apnea? N   Do you have Pulmonary Hypertension? N   Do you have UNCONTROLLED asthma? N    5. Have you had a heart or lung transplant? N  (If yes, please review exclusion criteria)    6. Are you currently on dialysis?N  (If yes, schedule in HOSPITAL setting only)(If yes, please send Golytely prep)    7. Do you have chronic kidney disease? N (If yes, please send Golytely prep)    7. Have you had a stroke or Transient ischemic attack (TIA) within 6 months? N (If yes, do not schedule at [facility])    8. In the past 6 months, have you had any heart related issues including cardiomyopathy or heart attack? N (If yes, please review exclusion criteria)           If yes, did it require cardiac stenting or other implantable device?N  (If yes, please review exclusion criteria)      9. Do you have any implantable devices in your body (pacemaker, defib, LVAD)? N (If yes, schedule at [facility])    10. Do you take nitroglycerin? If yes, how often? N (if yes, schedule at HOSPITAL setting)    11. Are you currently taking any blood thinners?N (If yes- inform patient to follow up with PCP or provider for follow up instructions)     12. Are you a diabetic? N (If yes, please send Golytely prep)    13. (Females) Are you currently pregnant?   If yes, how many weeks?      15. Are you taking any prescription pain medications on a routine schedule? N If yes, MAC sedation and patient will need EXTENDED PREP.    16. Do you have any chemical dependencies such as alcohol, street drugs, or methadone? N If yes, MAC sedation  and patient will need EXTENDED PREP    17. Do you have any history of post-traumatic stress syndrome, severe anxiety or history of psychosis? N  If yes, MAC sedation.     18. Do you transfer independently? Y    19.  Do you have any issues with constipation? N   If yes, pt will need EXTENDED PREP     20. Preferred Pharmacy for Pre Prescription [facility]     Scheduling Details    Which Colonoscopy Prep was Sent?: MIRALAX   Type of Procedure Scheduled: COLONOSCOPY   Surgeon: DR. BLANCO  Date of Procedure: 02/08/2022  Location:    Caller (Please ask for phone number if not scheduled by patient): Christ POLLOCK       Sedation Type: CS  Conscious Sedation- Needs  for 6 hours after the procedure  MAC/General-Needs  for 24 hours after procedure    Pre-op Required at [facility] [place], [place] and OR for MAC sedation: N  (if yes advise patient they will need a pre-op prior to procedure)      Informed patient they will need an adult  Y  Cannot take any type of public or medical transportation alone    Pre-Procedure Covid test to be completed at [facility] or Externally: SCHEDULED    Confirmed Nurse will call to complete assessment Y    Additional comments: N ([facility] PATI[facility] NEED EXTENDED PREP)

## 2022-01-20 ENCOUNTER — TELEPHONE (OUTPATIENT)
Dept: FAMILY MEDICINE | Facility: CLINIC | Age: 52
End: 2022-01-20
Payer: COMMERCIAL

## 2022-01-20 NOTE — TELEPHONE ENCOUNTER
"Patient calling in to report he is COVID positive. Nurse covered below information with the patient. He verbalized understanding.     Discharge Instructions for COVID-19 Patients  You have--or may have--COVID-19. Please follow the instructions listed below.   If you have a weakened immune system, discuss with your doctor any other actions you need to take.  How can I protect others?  If you have symptoms (fever, cough, body aches or trouble breathing):    Stay home and away from others (self-isolate) until:  ? Your other symptoms have resolved (gotten better). And   ? You've had no fever--and no medicine that reduces fever--for 1 full day (24 hours). And   ? At least 10 days have passed since your symptoms started. (You may need to wait 20 days. Follow the advice of your care team.)  If you don't show symptoms, but testing showed that you have COVID-19:    Stay home and away from others (self-isolate) until at least 10 days have passed since the date of your first positive COVID-19 test.  During this time    Stay in your own room, even for meals. Use your own bathroom if you can.    Stay away from others in your home. No hugging, kissing or shaking hands. No visitors.    Don't go to work, school or anywhere else.    Clean \"high touch\" surfaces often (doorknobs, counters, handles). Use household cleaning spray or wipes.    You'll find a full list of  on the EPA website: www.epa.gov/pesticide-registration/list-n-disinfectants-use-against-sars-cov-2.    Cover your mouth and nose with a mask or other face covering to avoid spreading germs.    Wash your hands and face often. Use soap and water.    Caregivers in these groups are at risk for severe illness due to COVID-19:  ? People 65 years and older  ? People who live in a nursing home or long-term care facility  ? People with chronic disease (lung, heart, cancer, diabetes, kidney, liver, immunologic)  ? People who have a weakened immune system, including those " who:    Are in cancer treatment    Take medicine that weakens the immune system, such as corticosteroids    Had a bone marrow or organ transplant    Have an immune deficiency    Have poorly controlled HIV or AIDS    Are obese (body mass index of 40 or higher)    Smoke regularly    Caregivers should wear gloves while washing dishes, handling laundry and cleaning bedrooms and bathrooms.    Use caution when washing and drying laundry: Don't shake dirty laundry and use the warmest water setting that you can.    For more tips on managing your health at home, go to www.cdc.gov/coronavirus/2019-ncov/downloads/10Things.pdf.  How can I take care of myself at home?  1. Get lots of rest. Drink extra fluids (unless a doctor has told you not to).  2. Take Tylenol (acetaminophen) for fever or pain. If you have liver or kidney problems, ask your family doctor if it's okay to take Tylenol.   Adults can take either:   ? 650 mg (two 325 mg pills) every 4 to 6 hours, or   ? 1,000 mg (two 500 mg pills) every 8 hours as needed.  ? Note: Don't take more than 3,000 mg in one day. Acetaminophen is found in many medicines (both prescribed and over-the-counter medicines). Read all labels to be sure you don't take too much.   For children, check the Tylenol bottle for the right dose. The dose is based on the child's age or weight.  3. If you have other health problems (like cancer, heart failure, an organ transplant or severe kidney disease): Call your specialty clinic if you don't feel better in the next 2 days.  4. Know when to call 911. Emergency warning signs include:  ? Trouble breathing or shortness of breath  ? Pain or pressure in the chest that doesn't go away  ? Feeling confused like you haven't felt before, or not being able to wake up  ? Bluish-colored lips or face  5. Your doctor may have prescribed a blood thinner medicine. Follow their instructions.  Where can I get more information?     [facility] - About COVID-19:    [email or web address]    CDC - What to Do If You're Sick: www.cdc.gov/coronavirus/2019-ncov/about/steps-when-sick.html    CDC - Ending Home Isolation: www.cdc.gov/coronavirus/2019-ncov/hcp/disposition-in-home-patients.html    CDC - Caring for Someone: www.cdc.gov/coronavirus/2019-ncov/if-you-are-sick/care-for-someone.html    [facility] - Interim Guidance for Hospital Discharge to Home: www.health.[facility].[place]./diseases/coronavirus/hcp/hospdischarge.pdf    Below are the COVID-19 hotlines at the [place] Department of Health ([facility]). Interpreters are available.  ? For health questions: Call 816-576-0839 or 1-765.912.7107 (7 a.m. to 7 p.m.)  ? For questions about schools and childcare: Call 627-254-8443 or 1-884.994.4504 (7 a.m. to 7 p.m.)    For informational purposes only. Not to replace the advice of your health care provider. Clinically reviewed by Dr. Ross Pelaez.   Copyright   2020 [facility]. All rights reserved. [employer] 316396 - REV 01/05/21.      Geeta Padron RN

## 2022-01-28 DIAGNOSIS — Z11.59 ENCOUNTER FOR SCREENING FOR OTHER VIRAL DISEASES: Primary | ICD-10-CM

## 2022-02-01 ASSESSMENT — MIFFLIN-ST. JEOR: SCORE: 1474.96

## 2022-02-07 ENCOUNTER — TELEPHONE (OUTPATIENT)
Dept: GASTROENTEROLOGY | Facility: CLINIC | Age: 52
End: 2022-02-07
Payer: COMMERCIAL

## 2022-02-07 NOTE — TELEPHONE ENCOUNTER
Caller: Christ    Procedure: Colonoscopy    Date, Location, and Surgeon of Procedure Cancelled: 2/8/2022, Nayla ERNANDEZ    Ordering Provider:Tone Vanessa PA-C in [facility]    Reason for cancel (please be detailed, any staff messages or encounters to note?): did not received prep information until one day before procedure    Rescheduled: Y     If rescheduled:    Date: 3/14   Location:    Note any change or update to original order/sedation: no change. CS, Miralax

## 2022-02-10 ENCOUNTER — TELEPHONE (OUTPATIENT)
Dept: FAMILY MEDICINE | Facility: CLINIC | Age: 52
End: 2022-02-10
Payer: COMMERCIAL

## 2022-02-10 DIAGNOSIS — I10 BENIGN ESSENTIAL HYPERTENSION: ICD-10-CM

## 2022-02-11 RX ORDER — AMLODIPINE BESYLATE 2.5 MG/1
TABLET ORAL
Qty: 60 TABLET | Refills: 0 | Status: SHIPPED | OUTPATIENT
Start: 2022-02-11 | End: 2022-08-23

## 2022-02-11 NOTE — TELEPHONE ENCOUNTER
See plan from 12/14/21 visit with Tone Vanessa:    12/14/2021 Tone Vanessa PA-C Office Visit  Return in about 2 months (around 2/14/2022) for BP Recheck.         Medication is being filled for 1 time refill only due to:  due for office visit for BP per PCP plan     Encounter routed to team to reach out to patient to schedule office visit for BP follow up.      Magdalena Hill RN  [facility]

## 2022-02-14 DIAGNOSIS — Z11.59 ENCOUNTER FOR SCREENING FOR OTHER VIRAL DISEASES: Primary | ICD-10-CM

## 2022-03-10 ENCOUNTER — LAB (OUTPATIENT)
Dept: LAB | Facility: CLINIC | Age: 52
End: 2022-03-10
Payer: COMMERCIAL

## 2022-03-10 DIAGNOSIS — Z11.59 ENCOUNTER FOR SCREENING FOR OTHER VIRAL DISEASES: ICD-10-CM

## 2022-03-10 PROCEDURE — U0005 INFEC AGEN DETEC AMPLI PROBE: HCPCS

## 2022-03-10 PROCEDURE — U0003 INFECTIOUS AGENT DETECTION BY NUCLEIC ACID (DNA OR RNA); SEVERE ACUTE RESPIRATORY SYNDROME CORONAVIRUS 2 (SARS-COV-2) (CORONAVIRUS DISEASE [COVID-19]), AMPLIFIED PROBE TECHNIQUE, MAKING USE OF HIGH THROUGHPUT TECHNOLOGIES AS DESCRIBED BY CMS-2020-01-R: HCPCS

## 2022-03-11 LAB — SARS-COV-2 RNA RESP QL NAA+PROBE: NEGATIVE

## 2022-03-14 ENCOUNTER — HOSPITAL ENCOUNTER (OUTPATIENT)
Facility: AMBULATORY SURGERY CENTER | Age: 52
Discharge: HOME | End: 2022-03-14
Attending: SURGERY | Admitting: SURGERY
Payer: COMMERCIAL

## 2022-03-14 VITALS
BODY MASS INDEX: 25.83 KG/M2 | HEART RATE: 72 BPM | TEMPERATURE: 97.7 F | WEIGHT: 155 LBS | OXYGEN SATURATION: 96 % | RESPIRATION RATE: 16 BRPM | DIASTOLIC BLOOD PRESSURE: 105 MMHG | SYSTOLIC BLOOD PRESSURE: 157 MMHG | HEIGHT: 65 IN

## 2022-03-14 LAB — COLONOSCOPY: NORMAL

## 2022-03-14 PROCEDURE — 45378 DIAGNOSTIC COLONOSCOPY: CPT

## 2022-03-14 PROCEDURE — G8918 PT W/O PREOP ORDER IV AB PRO: HCPCS

## 2022-03-14 PROCEDURE — 99152 MOD SED SAME PHYS/QHP 5/>YRS: CPT | Mod: PT | Performed by: SURGERY

## 2022-03-14 PROCEDURE — G0121 COLON CA SCRN NOT HI RSK IND: HCPCS | Performed by: SURGERY

## 2022-03-14 PROCEDURE — G8907 PT DOC NO EVENTS ON DISCHARG: HCPCS

## 2022-03-14 RX ORDER — FENTANYL CITRATE 50 UG/ML
INJECTION, SOLUTION INTRAMUSCULAR; INTRAVENOUS PRN
Status: DISCONTINUED | OUTPATIENT
Start: 2022-03-14 | End: 2022-03-14 | Stop reason: HOSPADM

## 2022-03-14 RX ORDER — ONDANSETRON 2 MG/ML
4 INJECTION INTRAMUSCULAR; INTRAVENOUS
Status: DISCONTINUED | OUTPATIENT
Start: 2022-03-14 | End: 2022-03-15 | Stop reason: HOSPADM

## 2022-03-14 RX ORDER — LIDOCAINE 40 MG/G
CREAM TOPICAL
Status: DISCONTINUED | OUTPATIENT
Start: 2022-03-14 | End: 2022-03-15 | Stop reason: HOSPADM

## 2022-03-14 NOTE — H&P
ENDOSCOPY PRE-SEDATION H&P FOR OUTPATIENT PROCEDURES    Christ POLLOCK   [id number]  1970    Procedure:   Colonoscopy possible biopsy, possible polypectomy, with moderate sedation.     Pre-procedure diagnosis: screen    Past medical history:   Past Medical History:   Diagnosis Date     History of hepatitis B      Hypertension        Past surgical history:   Past Surgical History:   Procedure Laterality Date     ENT SURGERY  6-29-15    Removal of anterior neck mass- Dr. Willard     EXCISE MASS NECK Right 6/29/2015    Procedure: EXCISE MASS NECK;  Surgeon: Florentino Willard MD;  Location: [facility] OR       Current Outpatient Medications   Medication     acetaminophen (TYLENOL) 325 MG tablet     albuterol (PROAIR RESPICLICK) 108 (90 Base) MCG/ACT inhaler     amLODIPine (NORVASC) 2.5 MG tablet     diclofenac (VOLTAREN) 75 MG EC tablet     losartan (COZAAR) 100 MG tablet     mometasone-formoterol (DULERA) 100-5 MCG/ACT inhaler     montelukast (SINGULAIR) 10 MG tablet     Current Facility-Administered Medications   Medication     lidocaine (LMX4) kit     lidocaine 1 % 0.1-1 mL     ondansetron (ZOFRAN) injection 4 mg     sodium chloride (PF) 0.9% PF flush 3 mL     sodium chloride (PF) 0.9% PF flush 3 mL       No Known Allergies    History of Anesthesia/Sedation Problems: no    Physical Exam:    Mental status: alert  Heart: Normal  Lung: Normal  Assessment of patient's airway: Normal  Other as pertinent for procedure: None     ASA Score: See Provation note    Mallampati score:  I - Faucial pillars, soft palate, and uvula are visible    Assessment/Plan:     The patient is an appropriate candidate to receive sedation.    Informed consent was discussed with the patient/family, including the risks, benefits, potential complications and any alternative options associated with sedation.    Patient assessment completed just prior to sedation and while under constant observation by the provider. Condition determined to be adequate for  proceeding with sedation.    The specific risks for the procedure were discussed with the patient at the time of informed consent and include but are not limited to perforation which could require surgery, missing significant neoplasm or lesion, hemorrhage and adverse sedative complication.      Santana Merlos MD

## 2022-06-21 DIAGNOSIS — I10 BENIGN ESSENTIAL HYPERTENSION: ICD-10-CM

## 2022-06-21 DIAGNOSIS — J45.41 MODERATE PERSISTENT ASTHMA WITH EXACERBATION: ICD-10-CM

## 2022-06-21 DIAGNOSIS — J30.1 CHRONIC SEASONAL ALLERGIC RHINITIS DUE TO POLLEN: ICD-10-CM

## 2022-06-21 NOTE — TELEPHONE ENCOUNTER
Patient requesting refill of montelukast (SINGULAIR) 10 MG tablet, albuterol (PROAIR RESPICLICK) 108 (90 Base) MCG/ACT inhaler and losartan (COZAAR) 100 MG tablet.

## 2022-06-22 RX ORDER — LOSARTAN POTASSIUM 100 MG/1
100 TABLET ORAL DAILY
Qty: 30 TABLET | Refills: 0 | Status: SHIPPED | OUTPATIENT
Start: 2022-06-22 | End: 2022-08-19

## 2022-06-22 RX ORDER — MONTELUKAST SODIUM 10 MG/1
TABLET ORAL
Qty: 30 TABLET | Refills: 0 | Status: SHIPPED | OUTPATIENT
Start: 2022-06-22 | End: 2022-08-19

## 2022-06-22 RX ORDER — ALBUTEROL SULFATE 90 UG/1
1-2 POWDER, METERED RESPIRATORY (INHALATION) EVERY 6 HOURS PRN
Qty: 1 EACH | Refills: 4 | Status: SHIPPED | OUTPATIENT
Start: 2022-06-22 | End: 2024-05-08

## 2022-06-22 NOTE — TELEPHONE ENCOUNTER
Routing refill request to provider for review/approval because:  Drug not on the [facility] refill protocol   BP Readings from Last 3 Encounters:   03/14/22 (!) 157/105   12/14/21 122/89   05/11/21 123/79     ACT Total Scores 4/15/2019 8/18/2020 5/11/2021   ACT TOTAL SCORE (Goal Greater than or Equal to 20) 20 20 16   In the past 12 months, how many times did you visit the emergency room for your asthma without being admitted to the hospital? 0 0 0   In the past 12 months, how many times were you hospitalized overnight because of your asthma? 0 0 0

## 2022-06-23 ENCOUNTER — TELEPHONE (OUTPATIENT)
Dept: FAMILY MEDICINE | Facility: CLINIC | Age: 52
End: 2022-06-23

## 2022-06-23 NOTE — TELEPHONE ENCOUNTER
Prior Authorization Retail Medication Request    Medication/Dose: albuterol (PROAIR RESPICLICK) 108 (90 Base) MCG/ACT inhaler  ICD code (if different than what is on RX):  J45.41  Previously Tried and Failed:  na  Rationale:  na    Insurance Name:   [employer]  Insurance ID:  [id number]      Pharmacy Information (if different than what is on RX)  [facility]   Phone:  [phone]

## 2022-06-27 NOTE — TELEPHONE ENCOUNTER
Central Prior Authorization Team   Phone: [phone]      PA Initiation    Medication: albuterol (PROAIR RESPICLICK) 108 (90 Base) MCG/ACT inhaler  Insurance Company: [employer] - Phone [phone] Fax [phone]  Pharmacy Filling the Rx: [id number] - [place], [place] - [address] AT [facility] [address]  Filling Pharmacy Phone: [phone]  Filling Pharmacy Fax:    Start Date: 6/27/2022

## 2022-06-28 NOTE — TELEPHONE ENCOUNTER
Prior Authorization Approval    Authorization Effective Date: 6/27/2022  Authorization Expiration Date: 6/28/2023  Medication: albuterol (PROAIR RESPICLICK) 108 (90 Base) MCG/ACT inhaler-APPROVED  Approved Dose/Quantity:   Reference #:     Insurance Company: [employer] - Phone [phone] Fax [phone]  Expected CoPay:       CoPay Card Available:      Foundation Assistance Needed:    Which Pharmacy is filling the prescription (Not needed for infusion/clinic administered): [id number] - [place], [place] - [address] AT [facility] [address]  Pharmacy Notified: Yes  Patient Notified: No    **Instructed pharmacy to notify patient when script is ready to /ship.**

## 2022-08-19 DIAGNOSIS — I10 BENIGN ESSENTIAL HYPERTENSION: ICD-10-CM

## 2022-08-19 DIAGNOSIS — J30.1 CHRONIC SEASONAL ALLERGIC RHINITIS DUE TO POLLEN: ICD-10-CM

## 2022-08-19 DIAGNOSIS — J45.41 MODERATE PERSISTENT ASTHMA WITH EXACERBATION: ICD-10-CM

## 2022-08-19 RX ORDER — MONTELUKAST SODIUM 10 MG/1
TABLET ORAL
Qty: 30 TABLET | Refills: 0 | Status: SHIPPED | OUTPATIENT
Start: 2022-08-19 | End: 2022-08-23

## 2022-08-19 RX ORDER — LOSARTAN POTASSIUM 100 MG/1
100 TABLET ORAL DAILY
Qty: 30 TABLET | Refills: 0 | Status: SHIPPED | OUTPATIENT
Start: 2022-08-19 | End: 2022-08-23

## 2022-08-19 NOTE — TELEPHONE ENCOUNTER
Patient is calling for a refill on his Singulair and Losartan. Looks like patient also has amlodipine on his med list. He stated that he does not think take this one anymore, and has been out of meds for over a week.      Patient made an appointment on 8/23/22 with Tone Vanessa PA-C.    Routing refill request to provider for review/approval because:  Patient needs to be seen because:  Overdue for BP check.    Samaria RN,BSN  Triage Nurse  [facility]: [facility]  Ph: [phone]

## 2022-08-22 NOTE — TELEPHONE ENCOUNTER
Next Appt  With Family Practice (Tone Vanessa PA-C)  08/23/2022 at 9:00 AM      Lea Morales on 8/22/2022 at 12:39 PM

## 2022-08-22 NOTE — PROGRESS NOTES
"Sher Polo is a 52 year old, presenting for the following health issues:  Asthma (recheck) and Hypertension (recheck)      History of Present Illness     Asthma:  He presents for follow up of asthma.  He has some cough, some wheezing, and some shortness of breath. He is using a relief medication a few times a week. He typically misses taking his controller medication 1 time(s) per week.Patient is aware of the following triggers: same as previous visit. The patient has not had a visit to the Emergency Room, Urgent Care or Hospital due to asthma since the last clinic visit.     Hypertension: He presents for follow up of hypertension.  He does check blood pressure  regularly outside of the clinic. Outpatient blood pressures have not been over 140/90. He does not follow a low salt diet.       dulera was too expensive. Patient noting coughing and some wheezing.   No chest pain/sob/palpitations/dizziness/ha's      Review of Systems   Constitutional, HEENT, cardiovascular, pulmonary, GI, , musculoskeletal, neuro, skin, endocrine and psych systems are negative, except as otherwise noted.      Objective    /82   Pulse 52   Temp 98.3  F (36.8  C) (Tympanic)   Resp 24   Ht 1.651 m (5' 5\")   Wt 69.9 kg (154 lb)   SpO2 97%   BMI 25.63 kg/m    Body mass index is 25.63 kg/m .  Physical Exam     Eye exam - right eye normal lid, conjunctiva, cornea, pupil and fundus, left eye normal lid, conjunctiva, cornea, pupil and fundus.  ENT exam reveals - bilateral TM normal without fluid or infection, neck without nodes, throat normal without erythema or exudate, sinuses nontender, post nasal drip noted, nasal mucosa congested and nasal mucosa pale and congested.  Thyroid not palpable, not enlarged, no nodules detected.  CHEST:no tachypnea, retractions or cyanosis, mild inspiratory wheezing heard diffusely throughout both lungs, mild expiratory wheezing heard diffusely throughout both lungs and S1, S2 normal, no " murmur, no gallop, rate regular.    Christ was seen today for asthma and hypertension.    Diagnoses and all orders for this visit:    Benign essential hypertension  -     Comprehensive metabolic panel (BMP + Alb, Alk Phos, ALT, AST, Total. Bili, TP); Future  -     amLODIPine (NORVASC) 2.5 MG tablet; Take 1 tablet (2.5 mg) by mouth daily  -     losartan (COZAAR) 100 MG tablet; Take 1 tablet (100 mg) by mouth daily    Moderate persistent asthma with exacerbation  -     budesonide-formoterol (SYMBICORT) 80-4.5 MCG/ACT Inhaler; Inhale 2 puffs into the lungs 2 times daily  -     montelukast (SINGULAIR) 10 MG tablet; TAKE 1 TABLET(10 MG) BY MOUTH AT BEDTIME.  Due to be seen in April 2020  -     predniSONE (DELTASONE) 20 MG tablet; Take 1 tablet (20 mg) by mouth 2 times daily for 5 days    Chronic seasonal allergic rhinitis due to pollen  -     montelukast (SINGULAIR) 10 MG tablet; TAKE 1 TABLET(10 MG) BY MOUTH AT BEDTIME.  Due to be seen in April 2020    Cough  -     benzonatate (TESSALON) 200 MG capsule; Take 1 capsule (200 mg) by mouth 3 times daily as needed    Other orders  -     TDAP VACCINE (Adacel, Boostrix)      Start symbicort.  Recheck in 6 mos  work on lifestyle modification    .  ..

## 2022-08-23 ENCOUNTER — OFFICE VISIT (OUTPATIENT)
Dept: FAMILY MEDICINE | Facility: CLINIC | Age: 52
End: 2022-08-23
Payer: COMMERCIAL

## 2022-08-23 VITALS
HEIGHT: 65 IN | RESPIRATION RATE: 24 BRPM | OXYGEN SATURATION: 97 % | TEMPERATURE: 98.3 F | SYSTOLIC BLOOD PRESSURE: 122 MMHG | WEIGHT: 154 LBS | DIASTOLIC BLOOD PRESSURE: 82 MMHG | HEART RATE: 52 BPM | BODY MASS INDEX: 25.66 KG/M2

## 2022-08-23 DIAGNOSIS — J30.1 CHRONIC SEASONAL ALLERGIC RHINITIS DUE TO POLLEN: ICD-10-CM

## 2022-08-23 DIAGNOSIS — I10 BENIGN ESSENTIAL HYPERTENSION: Primary | ICD-10-CM

## 2022-08-23 DIAGNOSIS — R05.9 COUGH: ICD-10-CM

## 2022-08-23 DIAGNOSIS — J45.41 MODERATE PERSISTENT ASTHMA WITH EXACERBATION: ICD-10-CM

## 2022-08-23 LAB
ALBUMIN SERPL-MCNC: 3.8 G/DL (ref 3.4–5)
ALP SERPL-CCNC: 69 U/L (ref 40–150)
ALT SERPL W P-5'-P-CCNC: 45 U/L (ref 0–70)
ANION GAP SERPL CALCULATED.3IONS-SCNC: 3 MMOL/L (ref 3–14)
AST SERPL W P-5'-P-CCNC: 24 U/L (ref 0–45)
BILIRUB SERPL-MCNC: 0.5 MG/DL (ref 0.2–1.3)
BUN SERPL-MCNC: 14 MG/DL (ref 7–30)
CALCIUM SERPL-MCNC: 9.2 MG/DL (ref 8.5–10.1)
CHLORIDE BLD-SCNC: 105 MMOL/L (ref 94–109)
CO2 SERPL-SCNC: 31 MMOL/L (ref 20–32)
CREAT SERPL-MCNC: 0.9 MG/DL (ref 0.66–1.25)
GFR SERPL CREATININE-BSD FRML MDRD: >90 ML/MIN/1.73M2
GLUCOSE BLD-MCNC: 101 MG/DL (ref 70–99)
POTASSIUM BLD-SCNC: 4 MMOL/L (ref 3.4–5.3)
PROT SERPL-MCNC: 7.9 G/DL (ref 6.8–8.8)
SODIUM SERPL-SCNC: 139 MMOL/L (ref 133–144)

## 2022-08-23 PROCEDURE — 99214 OFFICE O/P EST MOD 30 MIN: CPT | Mod: 25 | Performed by: PHYSICIAN ASSISTANT

## 2022-08-23 PROCEDURE — 80053 COMPREHEN METABOLIC PANEL: CPT | Performed by: PHYSICIAN ASSISTANT

## 2022-08-23 PROCEDURE — 36415 COLL VENOUS BLD VENIPUNCTURE: CPT | Performed by: PHYSICIAN ASSISTANT

## 2022-08-23 PROCEDURE — 90471 IMMUNIZATION ADMIN: CPT | Performed by: PHYSICIAN ASSISTANT

## 2022-08-23 PROCEDURE — 90715 TDAP VACCINE 7 YRS/> IM: CPT | Performed by: PHYSICIAN ASSISTANT

## 2022-08-23 RX ORDER — LOSARTAN POTASSIUM 100 MG/1
100 TABLET ORAL DAILY
Qty: 90 TABLET | Refills: 1 | Status: SHIPPED | OUTPATIENT
Start: 2022-08-23 | End: 2023-07-31

## 2022-08-23 RX ORDER — BUDESONIDE AND FORMOTEROL FUMARATE DIHYDRATE 80; 4.5 UG/1; UG/1
2 AEROSOL RESPIRATORY (INHALATION) 2 TIMES DAILY
Qty: 10.2 G | Refills: 5 | Status: SHIPPED | OUTPATIENT
Start: 2022-08-23 | End: 2023-07-31

## 2022-08-23 RX ORDER — MONTELUKAST SODIUM 10 MG/1
TABLET ORAL
Qty: 90 TABLET | Refills: 3 | Status: SHIPPED | OUTPATIENT
Start: 2022-08-23 | End: 2024-05-08

## 2022-08-23 RX ORDER — BENZONATATE 200 MG/1
200 CAPSULE ORAL 3 TIMES DAILY PRN
Qty: 30 CAPSULE | Refills: 1 | Status: SHIPPED | OUTPATIENT
Start: 2022-08-23 | End: 2023-07-31

## 2022-08-23 RX ORDER — PREDNISONE 20 MG/1
20 TABLET ORAL 2 TIMES DAILY
Qty: 10 TABLET | Refills: 0 | Status: SHIPPED | OUTPATIENT
Start: 2022-08-23 | End: 2022-08-28

## 2022-08-23 RX ORDER — AMLODIPINE BESYLATE 2.5 MG/1
2.5 TABLET ORAL DAILY
Qty: 90 TABLET | Refills: 1 | Status: SHIPPED | OUTPATIENT
Start: 2022-08-23 | End: 2023-07-31

## 2022-08-23 ASSESSMENT — ASTHMA QUESTIONNAIRES
QUESTION_2 LAST FOUR WEEKS HOW OFTEN HAVE YOU HAD SHORTNESS OF BREATH: ONCE OR TWICE A WEEK
QUESTION_5 LAST FOUR WEEKS HOW WOULD YOU RATE YOUR ASTHMA CONTROL: SOMEWHAT CONTROLLED
ACT_TOTALSCORE: 16
QUESTION_1 LAST FOUR WEEKS HOW MUCH OF THE TIME DID YOUR ASTHMA KEEP YOU FROM GETTING AS MUCH DONE AT WORK, SCHOOL OR AT HOME: NONE OF THE TIME
QUESTION_3 LAST FOUR WEEKS HOW OFTEN DID YOUR ASTHMA SYMPTOMS (WHEEZING, COUGHING, SHORTNESS OF BREATH, CHEST TIGHTNESS OR PAIN) WAKE YOU UP AT NIGHT OR EARLIER THAN USUAL IN THE MORNING: TWO OR THREE NIGHTS A WEEK
QUESTION_4 LAST FOUR WEEKS HOW OFTEN HAVE YOU USED YOUR RESCUE INHALER OR NEBULIZER MEDICATION (SUCH AS ALBUTEROL): ONE OR TWO TIMES PER DAY
ACT_TOTALSCORE: 16

## 2022-08-23 ASSESSMENT — PAIN SCALES - GENERAL: PAINLEVEL: EXTREME PAIN (8)

## 2022-08-23 NOTE — LETTER
My Asthma Action Plan    Name: Christ Gomes   YOB: 1970  Date: 8/23/2022   My doctor: Tone Vanessa PA-C   My clinic: [facility] [place]        My Control Medicine:   My Rescue Medicine:    My Asthma Severity:   Moderate Persistent  Know your asthma triggers:   unsure            GREEN ZONE   Good Control    I feel good    No cough or wheeze    Can work, sleep and play without asthma symptoms       Take your asthma control medicine every day.     1. If exercise triggers your asthma, take your rescue medication    15 minutes before exercise or sports, and    During exercise if you have asthma symptoms  2. Spacer to use with inhaler: If you have a spacer, make sure to use it with your inhaler             YELLOW ZONE Getting Worse  I have ANY of these:    I do not feel good    Cough or wheeze    Chest feels tight    Wake up at night   1. Keep taking your Green Zone medications  2. Start taking your rescue medicine:    every 20 minutes for up to 1 hour. Then every 4 hours for 24-48 hours.  3. If you stay in the Yellow Zone for more than 12-24 hours, contact your doctor.  4. If you do not return to the Green Zone in 12-24 hours or you get worse, start taking your oral steroid medicine if prescribed by your provider.           RED ZONE Medical Alert - Get Help  I have ANY of these:    I feel awful    Medicine is not helping    Breathing getting harder    Trouble walking or talking    Nose opens wide to breathe       1. Take your rescue medicine NOW  2. If your provider has prescribed an oral steroid medicine, start taking it NOW  3. Call your doctor NOW  4. If you are still in the Red Zone after 20 minutes and you have not reached your doctor:    Take your rescue medicine again and    Call 911 or go to the emergency room right away    See your regular doctor within 2 weeks of an Emergency Room or Urgent Care visit for follow-up treatment.          Annual Reminders:  Meet with Asthma Educator,   Flu Shot in the Fall, consider Pneumonia Vaccination for patients with asthma (aged 19 and older).    Pharmacy: [id number] - [place] - [address] AT [facility] [address]    Electronically signed by Tone Vanessa PA-C   Date: 08/23/22                      Asthma Triggers  How To Control Things That Make Your Asthma Worse    Triggers are things that make your asthma worse.  Look at the list below to help you find your triggers and what you can do about them.  You can help prevent asthma flare-ups by staying away from your triggers.      Trigger                                                          What you can do   Cigarette Smoke  Tobacco smoke can make asthma worse. Do not allow smoking in your home, car or around you.  Be sure no one smokes at a child s day care or school.  If you smoke, ask your health care provider for ways to help you quit.  Ask family members to quit too.  Ask your health care provider for a referral to Quit Plan to help you quit smoking, or call 2-595-190-PLAN.     Colds, Flu, Bronchitis  These are common triggers of asthma. Wash your hands often.  Don t touch your eyes, nose or mouth.  Get a flu shot every year.     Dust Mites  These are tiny bugs that live in cloth or carpet. They are too small to see. Wash sheets and blankets in hot water every week.   Encase pillows and mattress in dust mite proof covers.  Avoid having carpet if you can. If you have carpet, vacuum weekly.   Use a dust mask and HEPA vacuum.   Pollen and Outdoor Mold  Some people are allergic to trees, grass, or weed pollen, or molds. Try to keep your windows closed.  Limit time out doors when pollen count is high.   Ask you health care provider about taking medicine during allergy season.     Animal Dander  Some people are allergic to skin flakes, urine or saliva from pets with fur or feathers. Keep pets with fur or feathers out of your home.    If you can t keep the pet  outdoors, then keep the pet out of your bedroom.  Keep the bedroom door closed.  Keep pets off cloth furniture and away from stuffed toys.     Mice, Rats, and Cockroaches   Some people are allergic to the waste from these pests.   Cover food and garbage.  Clean up spills and food crumbs.  Store grease in the refrigerator.   Keep food out of the bedroom.   Indoor Mold  This can be a trigger if your home has high moisture. Fix leaking faucets, pipes, or other sources of water.   Clean moldy surfaces.  Dehumidify basement if it is damp and smelly.   Smoke, Strong Odors, and Sprays  These can reduce air quality. Stay away from strong odors and sprays, such as perfume, powder, hair spray, paints, smoke incense, paint, cleaning products, candles and new carpet.   Exercise or Sports  Some people with asthma have this trigger. Be active!  Ask your doctor about taking medicine before sports or exercise to prevent symptoms.    Warm up for 5-10 minutes before and after sports or exercise.     Other Triggers of Asthma  Cold air:  Cover your nose and mouth with a scarf.  Sometimes laughing or crying can be a trigger.  Some medicines and food can trigger asthma.

## 2022-08-23 NOTE — TELEPHONE ENCOUNTER
Patient had an appointment today.    Samaria RN,BSN  Triage Nurse  [facility]: [facility]  Ph: [phone]

## 2022-08-24 ENCOUNTER — TELEPHONE (OUTPATIENT)
Dept: FAMILY MEDICINE | Facility: CLINIC | Age: 52
End: 2022-08-24

## 2022-08-24 NOTE — TELEPHONE ENCOUNTER
Prior Authorization Retail Medication Request    Medication/Dose: budesonide-formoterol (SYMBICORT) 80-4.5 MCG/ACT Inhaler  ICD code (if different than what is on RX):  J45.41  Previously Tried and Failed:  na  Rationale:  na    Insurance Name:  [employer]  Insurance ID:    [id number]    Pharmacy Information (if different than what is on RX)  [facility]  Phone:  [phone]

## 2022-08-24 NOTE — TELEPHONE ENCOUNTER
Central Prior Authorization Team   Phone: [phone]      PA NOT NEEDED    Medication: budesonide-formoterol (SYMBICORT) 80-4.5 MCG/ACT Inhaler  Insurance Company:    Pharmacy Filling the Rx: [id number] - [place], [place] - [address] [facility] [address]  Filling Pharmacy Phone: [phone]  Filling Pharmacy Fax:    Start Date: 8/24/2022    Called pharmacy to verify if they are running Brand or generic, they were running generic, they switched to Brand and received a paid claim.  **Instructed pharmacy to notify patient when script is ready to /ship.**

## 2022-09-13 ENCOUNTER — TELEPHONE (OUTPATIENT)
Dept: FAMILY MEDICINE | Facility: CLINIC | Age: 52
End: 2022-09-13

## 2022-09-13 NOTE — TELEPHONE ENCOUNTER
Patient is calling for his lab result from 8/23/22. Not resulted in epic by provider.  Routed to please advise.    Samaria RN,BSN  Triage Nurse  [facility]: [facility]  Ph: [phone]

## 2022-09-13 NOTE — TELEPHONE ENCOUNTER
Notified of his results below per PCP.    Routed to TC to please mail results to patient.    Samaria RN,BSN  Triage Nurse  [facility]: [facility]  Ph: [phone]

## 2023-07-31 ENCOUNTER — OFFICE VISIT (OUTPATIENT)
Dept: FAMILY MEDICINE | Facility: CLINIC | Age: 53
End: 2023-07-31
Payer: COMMERCIAL

## 2023-07-31 ENCOUNTER — ANCILLARY PROCEDURE (OUTPATIENT)
Dept: GENERAL RADIOLOGY | Facility: CLINIC | Age: 53
End: 2023-07-31
Attending: PHYSICIAN ASSISTANT
Payer: COMMERCIAL

## 2023-07-31 VITALS
OXYGEN SATURATION: 98 % | RESPIRATION RATE: 20 BRPM | HEIGHT: 65 IN | DIASTOLIC BLOOD PRESSURE: 84 MMHG | BODY MASS INDEX: 26.06 KG/M2 | HEART RATE: 89 BPM | WEIGHT: 156.4 LBS | SYSTOLIC BLOOD PRESSURE: 120 MMHG | TEMPERATURE: 97.9 F

## 2023-07-31 DIAGNOSIS — I10 BENIGN ESSENTIAL HYPERTENSION: ICD-10-CM

## 2023-07-31 DIAGNOSIS — Z00.00 ROUTINE GENERAL MEDICAL EXAMINATION AT A HEALTH CARE FACILITY: Primary | ICD-10-CM

## 2023-07-31 DIAGNOSIS — Z12.5 SCREENING FOR PROSTATE CANCER: ICD-10-CM

## 2023-07-31 DIAGNOSIS — J45.41 MODERATE PERSISTENT ASTHMA WITH EXACERBATION: ICD-10-CM

## 2023-07-31 DIAGNOSIS — E78.5 HYPERLIPIDEMIA LDL GOAL <160: ICD-10-CM

## 2023-07-31 DIAGNOSIS — K04.7 TOOTH ABSCESS: ICD-10-CM

## 2023-07-31 LAB
BASOPHILS # BLD AUTO: 0.1 10E3/UL (ref 0–0.2)
BASOPHILS NFR BLD AUTO: 1 %
EOSINOPHIL # BLD AUTO: 0.4 10E3/UL (ref 0–0.7)
EOSINOPHIL NFR BLD AUTO: 5 %
ERYTHROCYTE [DISTWIDTH] IN BLOOD BY AUTOMATED COUNT: 12.5 % (ref 10–15)
HCT VFR BLD AUTO: 41.4 % (ref 40–53)
HGB BLD-MCNC: 14.8 G/DL (ref 13.3–17.7)
IMM GRANULOCYTES # BLD: 0 10E3/UL
IMM GRANULOCYTES NFR BLD: 0 %
LYMPHOCYTES # BLD AUTO: 2.4 10E3/UL (ref 0.8–5.3)
LYMPHOCYTES NFR BLD AUTO: 25 %
MCH RBC QN AUTO: 29.5 PG (ref 26.5–33)
MCHC RBC AUTO-ENTMCNC: 35.7 G/DL (ref 31.5–36.5)
MCV RBC AUTO: 83 FL (ref 78–100)
MONOCYTES # BLD AUTO: 0.7 10E3/UL (ref 0–1.3)
MONOCYTES NFR BLD AUTO: 7 %
NEUTROPHILS # BLD AUTO: 6 10E3/UL (ref 1.6–8.3)
NEUTROPHILS NFR BLD AUTO: 63 %
PLATELET # BLD AUTO: 218 10E3/UL (ref 150–450)
RBC # BLD AUTO: 5.02 10E6/UL (ref 4.4–5.9)
WBC # BLD AUTO: 9.6 10E3/UL (ref 4–11)

## 2023-07-31 PROCEDURE — 36415 COLL VENOUS BLD VENIPUNCTURE: CPT | Performed by: PHYSICIAN ASSISTANT

## 2023-07-31 PROCEDURE — 99214 OFFICE O/P EST MOD 30 MIN: CPT | Mod: 25 | Performed by: PHYSICIAN ASSISTANT

## 2023-07-31 PROCEDURE — 90471 IMMUNIZATION ADMIN: CPT | Performed by: PHYSICIAN ASSISTANT

## 2023-07-31 PROCEDURE — 71046 X-RAY EXAM CHEST 2 VIEWS: CPT | Mod: TC | Performed by: RADIOLOGY

## 2023-07-31 PROCEDURE — 80048 BASIC METABOLIC PNL TOTAL CA: CPT | Performed by: PHYSICIAN ASSISTANT

## 2023-07-31 PROCEDURE — 85025 COMPLETE CBC W/AUTO DIFF WBC: CPT | Performed by: PHYSICIAN ASSISTANT

## 2023-07-31 PROCEDURE — 90677 PCV20 VACCINE IM: CPT | Performed by: PHYSICIAN ASSISTANT

## 2023-07-31 PROCEDURE — 99396 PREV VISIT EST AGE 40-64: CPT | Mod: 25 | Performed by: PHYSICIAN ASSISTANT

## 2023-07-31 PROCEDURE — G0103 PSA SCREENING: HCPCS | Performed by: PHYSICIAN ASSISTANT

## 2023-07-31 RX ORDER — LOSARTAN POTASSIUM 100 MG/1
100 TABLET ORAL DAILY
Qty: 90 TABLET | Refills: 1 | Status: SHIPPED | OUTPATIENT
Start: 2023-07-31 | End: 2024-01-30

## 2023-07-31 RX ORDER — AMLODIPINE BESYLATE 2.5 MG/1
2.5 TABLET ORAL DAILY
Qty: 90 TABLET | Refills: 1 | Status: SHIPPED | OUTPATIENT
Start: 2023-07-31 | End: 2023-12-11

## 2023-07-31 ASSESSMENT — ENCOUNTER SYMPTOMS
FEVER: 0
DIARRHEA: 0
HEMATURIA: 0
CHILLS: 0
MYALGIAS: 0
EYE PAIN: 0
ARTHRALGIAS: 0
SORE THROAT: 0
DIZZINESS: 0
HEMATOCHEZIA: 1
PALPITATIONS: 0
NERVOUS/ANXIOUS: 0
JOINT SWELLING: 0
FREQUENCY: 0
SHORTNESS OF BREATH: 0
DYSURIA: 0
COUGH: 0
CONSTIPATION: 0
PARESTHESIAS: 0
ABDOMINAL PAIN: 0
NAUSEA: 0
HEADACHES: 0
HEARTBURN: 0
WEAKNESS: 0

## 2023-07-31 ASSESSMENT — PAIN SCALES - GENERAL: PAINLEVEL: NO PAIN (0)

## 2023-07-31 ASSESSMENT — ASTHMA QUESTIONNAIRES
QUESTION_4 LAST FOUR WEEKS HOW OFTEN HAVE YOU USED YOUR RESCUE INHALER OR NEBULIZER MEDICATION (SUCH AS ALBUTEROL): ONCE A WEEK OR LESS
QUESTION_2 LAST FOUR WEEKS HOW OFTEN HAVE YOU HAD SHORTNESS OF BREATH: ONCE OR TWICE A WEEK
QUESTION_5 LAST FOUR WEEKS HOW WOULD YOU RATE YOUR ASTHMA CONTROL: SOMEWHAT CONTROLLED
QUESTION_3 LAST FOUR WEEKS HOW OFTEN DID YOUR ASTHMA SYMPTOMS (WHEEZING, COUGHING, SHORTNESS OF BREATH, CHEST TIGHTNESS OR PAIN) WAKE YOU UP AT NIGHT OR EARLIER THAN USUAL IN THE MORNING: ONCE A WEEK
QUESTION_1 LAST FOUR WEEKS HOW MUCH OF THE TIME DID YOUR ASTHMA KEEP YOU FROM GETTING AS MUCH DONE AT WORK, SCHOOL OR AT HOME: ALL OF THE TIME
ACT_TOTALSCORE: 15
ACT_TOTALSCORE: 15

## 2023-07-31 NOTE — PROGRESS NOTES
SUBJECTIVE:   CC: Christ is an 53 year old who presents for preventative health visit.       7/31/2023     2:25 PM   Additional Questions   Roomed by Isabel Coy   Accompanied by None         7/31/2023     2:25 PM   Patient Reported Additional Medications   Patient reports taking the following new medications none     HPI:  Christ is a 52 y/o M with a PMH of hemorrhoids, asthma, and HTN presenting today for preventative health visit. Pt states he feels fatigued but attributes this to working 70hr/week. He also states that he uses his albuterol inhaler 2-3x/week, but is no longer using the Symbicort d/t its cost. He is willing to try an alternative controller therapy that is more cost efficient. Pt denies any chest pain, SOB, palpitations, changes in vision, n/v/d, abdominal pain, rashes, or lightheadedness.      Healthy Habits:     Getting at least 3 servings of Calcium per day:  Yes    Bi-annual eye exam:  NO    Dental care twice a year:  NO    Sleep apnea or symptoms of sleep apnea:  None    Diet:  Regular (no restrictions)    Frequency of exercise:  None    Taking medications regularly:  Yes    Medication side effects:  Other    Additional concerns today:  No      Social History     Tobacco Use    Smoking status: Never    Smokeless tobacco: Never   Substance Use Topics    Alcohol use: Yes     Comment: one beer per day             7/31/2023     2:36 PM   Alcohol Use   Prescreen: >3 drinks/day or >7 drinks/week? No       Last PSA:   PSA   Date Value Ref Range Status   08/18/2020 1.08 0 - 4 ug/L Final     Comment:     Assay Method:  Chemiluminescence using Siemens Vista analyzer     Prostate Specific Antigen Screen   Date Value Ref Range Status   12/14/2021 0.87 0.00 - 4.00 ug/L Final       Reviewed orders with patient. Reviewed health maintenance and updated orders accordingly - Yes  Lab work is in process    Reviewed and updated as needed this visit by clinical staff   Tobacco  Allergies  Meds           "    Reviewed and updated as needed this visit by Provider                 Past Medical History:   Diagnosis Date    History of hepatitis B     Hypertension       Past Surgical History:   Procedure Laterality Date    COLONOSCOPY WITH CO2 INSUFFLATION N/A 3/14/2022    Procedure: COLONOSCOPY, WITH CO2 INSUFFLATION;  Surgeon: Santana Merlos MD;  Location:  OR    ENT SURGERY  6-29-15    Removal of anterior neck mass- Dr. Willard    EXCISE MASS NECK Right 6/29/2015    Procedure: EXCISE MASS NECK;  Surgeon: Florentino Willard MD;  Location:  OR       Review of Systems   Constitutional:  Negative for chills and fever.   HENT:  Negative for congestion, ear pain, hearing loss and sore throat.    Eyes:  Positive for visual disturbance. Negative for pain.   Respiratory:  Negative for cough and shortness of breath.    Cardiovascular:  Negative for chest pain, palpitations and peripheral edema.   Gastrointestinal:  Positive for hematochezia. Negative for abdominal pain, constipation, diarrhea, heartburn and nausea.   Genitourinary:  Negative for dysuria, frequency, genital sores, hematuria, impotence, penile discharge and urgency.   Musculoskeletal:  Negative for arthralgias, joint swelling and myalgias.   Skin:  Negative for rash.   Neurological:  Negative for dizziness, weakness, headaches and paresthesias.   Psychiatric/Behavioral:  Negative for mood changes. The patient is not nervous/anxious.          OBJECTIVE:   /84   Pulse 89   Temp 97.9  F (36.6  C) (Temporal)   Resp 20   Ht 1.638 m (5' 4.5\")   Wt 70.9 kg (156 lb 6.4 oz)   SpO2 98%   BMI 26.43 kg/m      Physical Exam  GENERAL: healthy, alert and no distress  EYES: Eyes grossly normal to inspection, PERRL and conjunctivae and sclerae normal  HENT: ear canals and TM's normal, nose and mouth without ulcers or lesions  NECK: no adenopathy, no asymmetry, masses, or scars and thyroid normal to palpation  RESP: CLYDE wheezing, otherwise CTA  CV: regular " "rate and rhythm, normal S1 S2, no S3 or S4, no murmur, click or rub, no peripheral edema and peripheral pulses strong  ABDOMEN: soft, nontender, no hepatosplenomegaly, no masses and bowel sounds normal  MS: no gross musculoskeletal defects noted, no edema  SKIN: no suspicious lesions or rashes  NEURO: Normal strength and tone, mentation intact and speech normal  PSYCH: mentation appears normal, affect normal/bright        ASSESSMENT/PLAN:       ICD-10-CM    1. Routine general medical examination at a health care facility  Z00.00       2. Hyperlipidemia LDL goal <160  E78.5       3. Benign essential hypertension  I10 Basic metabolic panel  (Ca, Cl, CO2, Creat, Gluc, K, Na, BUN)     CBC with platelets and differential     amLODIPine (NORVASC) 2.5 MG tablet     losartan (COZAAR) 100 MG tablet      4. Screening for prostate cancer  Z12.5 PSA, screen      5. Moderate persistent asthma with exacerbation  J45.41 mometasone-formoterol (DULERA) 100-5 MCG/ACT inhaler     XR Chest 2 Views      6. Tooth abscess  K04.7         Plan:  HTN  - Continue amlodipine and losartan   - Encouraged home bp checks     2. Asthma  - Start Dulera BID QD   - Continue Singulair and prn albuterol  - CXR ordered d/t CLYDE wheezing     3. Routine Health Maintenance   - Administer PCV Vaccine today  - CBC, BMP, PSA, FLP ordered     4. Hemorrhoids  - Increase dietary fiber    COUNSELING:   Reviewed preventive health counseling, as reflected in patient instructions       Regular exercise       Healthy diet/nutrition       Prostate cancer screening      BMI:   Estimated body mass index is 26.43 kg/m  as calculated from the following:    Height as of this encounter: 1.638 m (5' 4.5\").    Weight as of this encounter: 70.9 kg (156 lb 6.4 oz).   Weight management plan: Discussed healthy diet and exercise guidelines      He reports that he has never smoked. He has never used smokeless tobacco.            RAF Rutherford-S2  [facility] "     BHAVANA [facility] [place]

## 2023-07-31 NOTE — PROGRESS NOTES
Answers submitted by the patient for this visit:  Annual Preventive Visit (Submitted on 7/31/2023)  Chief Complaint: Annual Exam:  Frequency of exercise:: None  Getting at least 3 servings of Calcium per day:: Yes  Diet:: Regular (no restrictions)  Taking medications regularly:: Yes  Medication side effects:: Other  Bi-annual eye exam:: NO  Dental care twice a year:: NO  Sleep apnea or symptoms of sleep apnea:: None  abdominal pain: No  Blood in stool: Yes  Blood in urine: No  chest pain: No  chills: No  congestion: No  constipation: No  cough: No  diarrhea: No  dizziness: No  ear pain: No  eye pain: No  nervous/anxious: No  fever: No  frequency: No  genital sores: No  headaches: No  hearing loss: No  heartburn: No  arthralgias: No  joint swelling: No  peripheral edema: No  mood changes: No  myalgias: No  nausea: No  dysuria: No  palpitations: No  Skin sensation changes: No  sore throat: No  urgency: No  rash: No  shortness of breath: No  visual disturbance: Yes  weakness: No  impotence: No  penile discharge: No  Additional concerns today:: No

## 2023-08-01 ENCOUNTER — TELEPHONE (OUTPATIENT)
Dept: FAMILY MEDICINE | Facility: CLINIC | Age: 53
End: 2023-08-01
Payer: COMMERCIAL

## 2023-08-01 LAB
ANION GAP SERPL CALCULATED.3IONS-SCNC: 9 MMOL/L (ref 7–15)
BUN SERPL-MCNC: 24.8 MG/DL (ref 6–20)
CALCIUM SERPL-MCNC: 9.2 MG/DL (ref 8.6–10)
CHLORIDE SERPL-SCNC: 103 MMOL/L (ref 98–107)
CREAT SERPL-MCNC: 0.96 MG/DL (ref 0.67–1.17)
DEPRECATED HCO3 PLAS-SCNC: 27 MMOL/L (ref 22–29)
GFR SERPL CREATININE-BSD FRML MDRD: >90 ML/MIN/1.73M2
GLUCOSE SERPL-MCNC: 93 MG/DL (ref 70–99)
POTASSIUM SERPL-SCNC: 4.5 MMOL/L (ref 3.4–5.3)
PSA SERPL DL<=0.01 NG/ML-MCNC: 0.88 NG/ML (ref 0–3.5)
SODIUM SERPL-SCNC: 139 MMOL/L (ref 136–145)

## 2023-08-01 NOTE — TELEPHONE ENCOUNTER
Prior Authorization Retail Medication Request    Medication/Dose: mometasone-formoterol (DULERA) 100-5 MCG/ACT inhaler   ICD code (if different than what is on RX):  J45.41   Previously Tried and Failed:  na  Rationale:  na    Insurance Name:  [facility]  Insurance ID:  [id number]       Pharmacy Information (if different than what is on RX)  Name:  [facility]  Phone:  [phone]

## 2023-08-03 NOTE — TELEPHONE ENCOUNTER
PA Initiation    Medication: MOMETASONE FURO-FORMOTEROL -5 MCG/ACT IN AERO  Insurance Company: [employer] ([facility]) - Phone [phone] Fax [phone]  Pharmacy Filling the Rx: [id number] - [place], [place] - [address] [facility] AT [facility] [address]  Filling Pharmacy Phone: [phone]  Filling Pharmacy Fax: [phone]  Start Date: 8/3/2023

## 2023-08-04 NOTE — TELEPHONE ENCOUNTER
PRIOR AUTHORIZATION DENIED    Medication: MOMETASONE FURO-FORMOTEROL -5 MCG/ACT IN AERO  Insurance Company: [employer] ([facility]) - Phone [phone] Fax [phone]  Denial Date: 8/3/2023  Denial Rational:     Appeal Information:     Patient Notified: No

## 2023-10-20 ENCOUNTER — TELEPHONE (OUTPATIENT)
Dept: FAMILY MEDICINE | Facility: CLINIC | Age: 53
End: 2023-10-20
Payer: COMMERCIAL

## 2023-10-20 NOTE — TELEPHONE ENCOUNTER
Patient called in to clinic to speak with PCP's care team. RN offered , patient declined. Patient was calling to ask about labs and imaging he had at last appointment on 7/31/23, patient stated he has not heard any results from it yet. Patient also requested to have those labs mailed out to him to review. Patient wanted to get Makenzie active, RN gave phone number [phone] for patient to call.      Patient reported he has hx of L sided chest pain that comes and goes and has experienced it recently but is NOT experiencing it now. RN educated patient with chest pain/SOB/pressure he should be evaluated in the ER immediately. Patient verbalized understanding.      Pratima Duque RN on 10/20/2023 at 12:01 PM

## 2023-10-23 NOTE — TELEPHONE ENCOUNTER
"Spoke with patient, relayed providers(Tone Vanessa) message below and patient verbalized understanding.    Patient states he still gets occasional chest pain that goes into his back and episodes of feeling tired; has been happening for the past 3 years; last episode occurred 2 days ago; unable to communicate how long these episodes last.    Patient's chart states no  needed, however this writer having trouble getting specific information from patient; asked to call patient back with ; patient declined and states \"later,\" patient at work.    Patient denies symptoms at this time; informed patient he should have follow up to discuss chest pain episodes; patient agreed; scheduled with first available provider tomorrow(10/24/2023 @ 4 pm with Erica Livingston); informed patient if symptoms return and severe(chest pain>5 min, becomes unbalanced, weakness down one side of the body, difficulty breathing) to go to ER/911 and don't wait for appointment.    Patient verbalized understanding and agreement.    Michela Franco RN on 10/23/2023 at 11:20 AM          "

## 2023-12-11 ENCOUNTER — OFFICE VISIT (OUTPATIENT)
Dept: FAMILY MEDICINE | Facility: CLINIC | Age: 53
End: 2023-12-11
Payer: COMMERCIAL

## 2023-12-11 VITALS
RESPIRATION RATE: 14 BRPM | SYSTOLIC BLOOD PRESSURE: 148 MMHG | DIASTOLIC BLOOD PRESSURE: 88 MMHG | TEMPERATURE: 97.4 F | HEART RATE: 82 BPM | WEIGHT: 153.6 LBS | HEIGHT: 65 IN | BODY MASS INDEX: 25.59 KG/M2 | OXYGEN SATURATION: 99 %

## 2023-12-11 DIAGNOSIS — M54.41 ACUTE BILATERAL LOW BACK PAIN WITH BILATERAL SCIATICA: Primary | ICD-10-CM

## 2023-12-11 DIAGNOSIS — M54.42 ACUTE BILATERAL LOW BACK PAIN WITH BILATERAL SCIATICA: Primary | ICD-10-CM

## 2023-12-11 DIAGNOSIS — I10 BENIGN ESSENTIAL HYPERTENSION: ICD-10-CM

## 2023-12-11 LAB
ALBUMIN UR-MCNC: NEGATIVE MG/DL
APPEARANCE UR: CLEAR
BILIRUB UR QL STRIP: NEGATIVE
COLOR UR AUTO: YELLOW
GLUCOSE UR STRIP-MCNC: NEGATIVE MG/DL
HGB UR QL STRIP: NEGATIVE
KETONES UR STRIP-MCNC: NEGATIVE MG/DL
LEUKOCYTE ESTERASE UR QL STRIP: NEGATIVE
NITRATE UR QL: NEGATIVE
PH UR STRIP: 6.5 [PH] (ref 5–7)
SP GR UR STRIP: 1.02 (ref 1–1.03)
UROBILINOGEN UR STRIP-ACNC: 0.2 E.U./DL

## 2023-12-11 PROCEDURE — 90471 IMMUNIZATION ADMIN: CPT | Performed by: PHYSICIAN ASSISTANT

## 2023-12-11 PROCEDURE — 81003 URINALYSIS AUTO W/O SCOPE: CPT | Performed by: PHYSICIAN ASSISTANT

## 2023-12-11 PROCEDURE — 90682 RIV4 VACC RECOMBINANT DNA IM: CPT | Performed by: PHYSICIAN ASSISTANT

## 2023-12-11 PROCEDURE — 99214 OFFICE O/P EST MOD 30 MIN: CPT | Mod: 25 | Performed by: PHYSICIAN ASSISTANT

## 2023-12-11 RX ORDER — CYCLOBENZAPRINE HCL 5 MG
2.5-5 TABLET ORAL 3 TIMES DAILY PRN
Qty: 25 TABLET | Refills: 0 | Status: SHIPPED | OUTPATIENT
Start: 2023-12-11 | End: 2024-01-30

## 2023-12-11 ASSESSMENT — ASTHMA QUESTIONNAIRES: ACT_TOTALSCORE: 19

## 2023-12-11 ASSESSMENT — ENCOUNTER SYMPTOMS: BACK PAIN: 1

## 2023-12-11 ASSESSMENT — PAIN SCALES - GENERAL: PAINLEVEL: EXTREME PAIN (8)

## 2023-12-11 NOTE — PATIENT INSTRUCTIONS
Elizabeth Polo,    Thank you for allowing [facility] to manage your care.    I am unsure of the cause of your symptoms, but your exam is reassuring. This is most likely sciatica or muscle strain. Let us know if you are not improving in the next 3-4 weeks.    If you develop worsening/changing symptoms at any time such as numbness/tingling in the groin, loss of control of urine/stool, weakness, fevers, etc, call 911/go to the emergency department for evaluation as we discussed.    I ordered some lab work. Please go to the laboratory to get your studies.    I sent your prescriptions to your pharmacy. For your pain, please use Tylenol 650mg every 6 hours. You may use 400mg of ibuprofen between doses of Tylenol.     Max acetaminophen (Tylenol) 4,000mg/24 hours  Max ibuprofen 3,000mg/24 hours    You may also try Voltaren, Salonpas or their generic equivalents over the counter as directed.    For severe pain not controlled by over the counter medications, please use Cyclobenzaprine as prescribed. Do not use this medication while driving, operating machinery, with other sedating medications, or while drinking alcohol as it will make you drowsy.    I made a referral to physical therapy. They will be calling in approximately 1 week to set up your appointment.  If you do not hear from them, please call the specialty number on your after visit summary.     Please allow 1-2 business days for our office to contact you in regards to your laboratory/radiological studies.  If not done so, I encourage you to login into [employer] ([email or web address] to review your results as well.     Your blood pressure was high today. Take and record your blood pressure twice daily for 2 weeks. If your blood pressure is greater than or equal to 140/90mm Hg on average, please contact us.    If you have any questions or concerns, please feel free to call us at [phone]    Sincerely,    Terry Hurd PA-C    Did you  know?      You can schedule a video visit for follow-up appointments as well as future appointments for certain conditions.  Please see the below link.     [email or web address]    If you have not already done so,  I encourage you to sign up for [employer] ([email or web address]).  This will allow you to review your results, securely communicate with a provider, and schedule virtual visits as well.

## 2023-12-11 NOTE — LETTER
December 11, 2023      Christ Gomes  [address] [facility] [address]        To Whom It May Concern:    Christ Gomes was seen in our clinic. He may return to work on 12/15/23, unless he feels he can return sooner. Please excuse his absence.      Sincerely,        RAF Klein

## 2023-12-11 NOTE — LETTER
December 11, 2023      Christ Gomes  [address] [facility] [address]        Dear ,    We are writing to inform you of your test results.    Your test results fall within the expected range(s) or remain unchanged from previous results.  Please continue with current treatment plan.    Resulted Orders   UA Macroscopic with reflex to Microscopic and Culture - Lab Collect   Result Value Ref Range    Color Urine Yellow Colorless, Straw, Light Yellow, Yellow    Appearance Urine Clear Clear    Glucose Urine Negative Negative mg/dL    Bilirubin Urine Negative Negative    Ketones Urine Negative Negative mg/dL    Specific Gravity Urine 1.020 1.003 - 1.035    Blood Urine Negative Negative    pH Urine 6.5 5.0 - 7.0    Protein Albumin Urine Negative Negative mg/dL    Urobilinogen Urine 0.2 0.2, 1.0 E.U./dL    Nitrite Urine Negative Negative    Leukocyte Esterase Urine Negative Negative    Narrative    Microscopic not indicated       If you have any questions or concerns, please call the clinic at the number listed above.       Sincerely,      RAF Klein

## 2023-12-11 NOTE — PROGRESS NOTES
Assessment & Plan   Problem List Items Addressed This Visit          Circulatory    Benign essential hypertension     Other Visit Diagnoses       Acute bilateral low back pain with bilateral sciatica    -  Primary    Relevant Medications    cyclobenzaprine (FLEXERIL) 5 MG tablet    Other Relevant Orders    Physical Therapy Referral    UA Macroscopic with reflex to Microscopic and Culture - Lab Collect           I do not believe a fracture to be the source of this patient's pain as there was no preceding trauma.  Based on this, no imaging of the spine was done.    I do not believe the pain is caused by an epidural abscess as the patient denies hx of IV drug use and does not have fevers/chills and no recent procedures.    I do not believe this patient's pain is from an infiltrative vertebral tumor as the patient does not have weight loss or night sweats and no known history of cancer.    I do not believe this patient's pain represents a rupture AAA.  There is no palpable, pulsatile mass on exam and patient does not have any ABD pain.      Based on history and exam, the most likely etiology of this patient's back pain is lumbosacral radiculopathy vs SI pain.  Emergent MRI is not indicated as this patient does not have new weakness or cauda equina syndrome.  Patient has no saddle anesthesia, bowel or bladder incontinence. Will send home with otc analgesics, muscle relaxant for breakthrough pain/spasm, rice/heat, and physical therapy referral.  Follow-up in 1 month if not improving.    Complete history and physical exam as below. Afebrile with normal vital signs except for elevated bp, which they will monitor at home and contact us if >140/90mmHg on average.    DDx and Dx discussed with and explained to the pt to their satisfaction.  All questions were answered at this time. Pt expressed understanding of and agreement with this dx, tx, and plan. No further workup warranted and standard medication warnings given. I have  given the patient a list of pertinent indications for re-evaluation. Will go to the Emergency Department if symptoms worsen or new concerning symptoms arise. Patient left in no apparent distress.     Ordering of each unique test  Prescription drug management  31 minutes spent by me on the date of the encounter doing chart review, history and exam, documentation and further activities per the note     See Patient Instructions    RAF Klein [facility] [place] Polo is a 53 year old, presenting for the following health issues:  Back Pain        12/11/2023     8:15 AM   Additional Questions   Roomed by Mary Jane Morales CMA   Accompanied by Wife         12/11/2023     8:15 AM   Patient Reported Additional Medications   Patient reports taking the following new medications Tynelol   Declined professional .    History of Present Illness       Back Pain:  He presents for follow up of back pain. Patient's back pain is a new problem.    Original cause of back pain: other  First noticed back pain: in the last week  Patient feels back pain: comes and goesLocation of back pain:  Right lower back, left lower back, right middle of back and left middle of back  Description of back pain: fullness, gnawing and sharp  Back pain spreads: right buttocks and left buttocks    Since patient first noticed back pain, pain is: unchanged  Does back pain interfere with his job:  Yes  On a scale of 1-10 (10 being the worst), patient describes pain as:  8  What makes back pain worse: bending, certain positions, sitting and twisting   Acupuncture: not tried  Acetaminophen: helpful  Activity or exercise: not helpful  Chiropractor:  Not tried  Cold: helpful  Heat: helpful  Massage: helpful  Muscle relaxants: not tried  NSAIDS: helpful  Opioids: not tried  Physical Therapy: not tried  Rest: helpful  Steroid Injection: not tried  Stretching: helpful  Surgery: not tried  TENS unit: not tried  Topical  "pain relievers: not tried  Other healthcare providers patient is seeing for back pain: None    He eats 2-3 servings of fruits and vegetables daily.He consumes 3 sweetened beverage(s) daily.He exercises with enough effort to increase his heart rate 9 or less minutes per day.  He is missing 2 dose(s) of medications per week.     No incontinence or saddle anesthesia. No weakness. No trauma. Works as an . No surgeries. Has one beer daily, but no tobacco or drugs. Ibu and APAP somewhat helpful.     Review of Systems   Musculoskeletal:  Positive for back pain.      Constitutional, HEENT, cardiovascular, pulmonary, gi and gu systems are negative, except as otherwise noted.      Objective    BP (!) 140/82   Pulse 82   Temp 97.4  F (36.3  C) (Temporal)   Resp 14   Ht 1.646 m (5' 4.8\")   Wt 69.7 kg (153 lb 9.6 oz)   SpO2 99%   BMI 25.72 kg/m    Body mass index is 25.72 kg/m .  Physical Exam  Vitals and nursing note reviewed.   Constitutional:       General: He is not in acute distress.     Appearance: He is not ill-appearing or diaphoretic.   HENT:      Head: Normocephalic and atraumatic.      Mouth/Throat:      Mouth: Mucous membranes are moist.   Eyes:      Conjunctiva/sclera: Conjunctivae normal.   Cardiovascular:      Rate and Rhythm: Normal rate and regular rhythm.      Heart sounds: Normal heart sounds. No murmur heard.     No friction rub. No gallop.   Pulmonary:      Effort: Pulmonary effort is normal. No respiratory distress.      Breath sounds: Normal breath sounds. No stridor. No wheezing, rhonchi or rales.   Abdominal:      General: Bowel sounds are normal. There is no distension.      Palpations: Abdomen is soft. There is no mass.      Tenderness: There is no abdominal tenderness. There is no guarding or rebound.      Hernia: No hernia is present.   Musculoskeletal:      Comments: No CVA or midline spinal tenderness. Mild tenderness over bilateral SIs. No overlying signs of trauma or infection. "   Skin:     General: Skin is warm and dry.   Neurological:      General: No focal deficit present.      Mental Status: He is alert. Mental status is at baseline.      Comments: Able to toe lift, heel walk and knee bend. SLR positive bilaterally.   Psychiatric:         Mood and Affect: Mood normal.         Behavior: Behavior normal.      UA

## 2024-01-30 ENCOUNTER — OFFICE VISIT (OUTPATIENT)
Dept: FAMILY MEDICINE | Facility: CLINIC | Age: 54
End: 2024-01-30
Payer: COMMERCIAL

## 2024-01-30 VITALS
SYSTOLIC BLOOD PRESSURE: 133 MMHG | HEART RATE: 101 BPM | WEIGHT: 159.8 LBS | OXYGEN SATURATION: 99 % | HEIGHT: 65 IN | RESPIRATION RATE: 20 BRPM | TEMPERATURE: 97.8 F | BODY MASS INDEX: 26.62 KG/M2 | DIASTOLIC BLOOD PRESSURE: 82 MMHG

## 2024-01-30 DIAGNOSIS — K64.8 BLEEDING INTERNAL HEMORRHOIDS: ICD-10-CM

## 2024-01-30 DIAGNOSIS — I10 BENIGN ESSENTIAL HYPERTENSION: Primary | ICD-10-CM

## 2024-01-30 DIAGNOSIS — K62.5 RECTAL BLEEDING: ICD-10-CM

## 2024-01-30 PROCEDURE — 99213 OFFICE O/P EST LOW 20 MIN: CPT | Performed by: PHYSICIAN ASSISTANT

## 2024-01-30 RX ORDER — LOSARTAN POTASSIUM 100 MG/1
100 TABLET ORAL DAILY
Qty: 90 TABLET | Refills: 1 | Status: SHIPPED | OUTPATIENT
Start: 2024-01-30 | End: 2024-05-08

## 2024-01-30 RX ORDER — HYDROCORTISONE 25 MG/G
CREAM TOPICAL 2 TIMES DAILY PRN
Qty: 30 G | Refills: 3 | Status: SHIPPED | OUTPATIENT
Start: 2024-01-30

## 2024-01-30 ASSESSMENT — ASTHMA QUESTIONNAIRES
ACT_TOTALSCORE: 16
QUESTION_3 LAST FOUR WEEKS HOW OFTEN DID YOUR ASTHMA SYMPTOMS (WHEEZING, COUGHING, SHORTNESS OF BREATH, CHEST TIGHTNESS OR PAIN) WAKE YOU UP AT NIGHT OR EARLIER THAN USUAL IN THE MORNING: ONCE OR TWICE
QUESTION_2 LAST FOUR WEEKS HOW OFTEN HAVE YOU HAD SHORTNESS OF BREATH: THREE TO SIX TIMES A WEEK
QUESTION_4 LAST FOUR WEEKS HOW OFTEN HAVE YOU USED YOUR RESCUE INHALER OR NEBULIZER MEDICATION (SUCH AS ALBUTEROL): TWO OR THREE TIMES PER WEEK
ACT_TOTALSCORE: 16
QUESTION_1 LAST FOUR WEEKS HOW MUCH OF THE TIME DID YOUR ASTHMA KEEP YOU FROM GETTING AS MUCH DONE AT WORK, SCHOOL OR AT HOME: SOME OF THE TIME
QUESTION_5 LAST FOUR WEEKS HOW WOULD YOU RATE YOUR ASTHMA CONTROL: SOMEWHAT CONTROLLED

## 2024-01-30 ASSESSMENT — PAIN SCALES - GENERAL: PAINLEVEL: NO PAIN (0)

## 2024-01-30 NOTE — LETTER
My Asthma Action Plan    Name: Christ Gomes   YOB: 1970  Date: 1/30/2024   My doctor: Tone Vanessa PA-C   My clinic: [facility] [place]        My Control Medicine:   My Rescue Medicine: Albuterol (Proair/Ventolin/Proventil HFA) 2-4 puffs EVERY 4 HOURS as needed. Use a spacer if recommended by your provider.   My Asthma Severity:   Moderate Persistent  Know your asthma triggers:   unsure            GREEN ZONE   Good Control  I feel good  No cough or wheeze  Can work, sleep and play without asthma symptoms       Take your asthma control medicine every day.     If exercise triggers your asthma, take your rescue medication  15 minutes before exercise or sports, and  During exercise if you have asthma symptoms  Spacer to use with inhaler: If you have a spacer, make sure to use it with your inhaler             YELLOW ZONE Getting Worse  I have ANY of these:  I do not feel good  Cough or wheeze  Chest feels tight  Wake up at night   Keep taking your Green Zone medications  Start taking your rescue medicine:  every 20 minutes for up to 1 hour. Then every 4 hours for 24-48 hours.  If you stay in the Yellow Zone for more than 12-24 hours, contact your doctor.  If you do not return to the Green Zone in 12-24 hours or you get worse, start taking your oral steroid medicine if prescribed by your provider.           RED ZONE Medical Alert - Get Help  I have ANY of these:  I feel awful  Medicine is not helping  Breathing getting harder  Trouble walking or talking  Nose opens wide to breathe       Take your rescue medicine NOW  If your provider has prescribed an oral steroid medicine, start taking it NOW  Call your doctor NOW  If you are still in the Red Zone after 20 minutes and you have not reached your doctor:  Take your rescue medicine again and  Call 911 or go to the emergency room right away    See your regular doctor within 2 weeks of an Emergency Room or Urgent Care visit for follow-up  treatment.          Annual Reminders:  Meet with Asthma Educator,  Flu Shot in the Fall, consider Pneumonia Vaccination for patients with asthma (aged 19 and older).    Pharmacy: [id number] - [place], [place] - [address] AT [facility] [address]    Electronically signed by Tone Vanessa PA-C   Date: 01/30/24                      Asthma Triggers  How To Control Things That Make Your Asthma Worse    Triggers are things that make your asthma worse.  Look at the list below to help you find your triggers and what you can do about them.  You can help prevent asthma flare-ups by staying away from your triggers.      Trigger                                                          What you can do   Cigarette Smoke  Tobacco smoke can make asthma worse. Do not allow smoking in your home, car or around you.  Be sure no one smokes at a child s day care or school.  If you smoke, ask your health care provider for ways to help you quit.  Ask family members to quit too.  Ask your health care provider for a referral to Quit Plan to help you quit smoking, or call 9-422-232-PLAN.     Colds, Flu, Bronchitis  These are common triggers of asthma. Wash your hands often.  Don t touch your eyes, nose or mouth.  Get a flu shot every year.     Dust Mites  These are tiny bugs that live in cloth or carpet. They are too small to see. Wash sheets and blankets in hot water every week.   Encase pillows and mattress in dust mite proof covers.  Avoid having carpet if you can. If you have carpet, vacuum weekly.   Use a dust mask and HEPA vacuum.   Pollen and Outdoor Mold  Some people are allergic to trees, grass, or weed pollen, or molds. Try to keep your windows closed.  Limit time out doors when pollen count is high.   Ask you health care provider about taking medicine during allergy season.     Animal Dander  Some people are allergic to skin flakes, urine or saliva from pets with fur or feathers. Keep pets with  fur or feathers out of your home.    If you can t keep the pet outdoors, then keep the pet out of your bedroom.  Keep the bedroom door closed.  Keep pets off cloth furniture and away from stuffed toys.     Mice, Rats, and Cockroaches   Some people are allergic to the waste from these pests.   Cover food and garbage.  Clean up spills and food crumbs.  Store grease in the refrigerator.   Keep food out of the bedroom.   Indoor Mold  This can be a trigger if your home has high moisture. Fix leaking faucets, pipes, or other sources of water.   Clean moldy surfaces.  Dehumidify basement if it is damp and smelly.   Smoke, Strong Odors, and Sprays  These can reduce air quality. Stay away from strong odors and sprays, such as perfume, powder, hair spray, paints, smoke incense, paint, cleaning products, candles and new carpet.   Exercise or Sports  Some people with asthma have this trigger. Be active!  Ask your doctor about taking medicine before sports or exercise to prevent symptoms.    Warm up for 5-10 minutes before and after sports or exercise.     Other Triggers of Asthma  Cold air:  Cover your nose and mouth with a scarf.  Sometimes laughing or crying can be a trigger.  Some medicines and food can trigger asthma.

## 2024-01-30 NOTE — PROGRESS NOTES
"    Sher Polo is a 53 year old, presenting for the following health issues:  Rectal Problem (Hemorrhoids and rectal bleeding again) and Health Maintenance (Patient is not fasting)        1/30/2024     2:08 PM   Additional Questions   Roomed by Isabel Coy CMA   Accompanied by None         1/30/2024     2:08 PM   Patient Reported Additional Medications   Patient reports taking the following new medications none     History of Present Illness       Reason for visit:  Blooding while i going to stool  Symptom onset:  3-7 days ago  Symptoms include:  None  Symptom intensity:  Moderate  Symptom progression:  Staying the same  Had these symptoms before:  Yes  Has tried/received treatment for these symptoms:  Yes  Previous treatment was successful:  Yes  Prior treatment description:  Get hemorrhoids medicines  What makes it worse:  N/A  What makes it better:  N/A    He eats 2-3 servings of fruits and vegetables daily.He consumes 1 sweetened beverage(s) daily.He exercises with enough effort to increase his heart rate 9 or less minutes per day.  He exercises with enough effort to increase his heart rate 3 or less days per week. He is missing 1 dose(s) of medications per week.  He is not taking prescribed medications regularly due to remembering to take and other.         Recheck of his htn  No chest pain/sob/palpitations/dizziness/ha's    Off and on blood from rectum. Colonoscopy less than 2 yrs ago was normal. No perianal pain noted.  No fatigue. No bowel changes.     Review of Systems  Constitutional, neuro, ENT, endocrine, pulmonary, cardiac, gastrointestinal, genitourinary, musculoskeletal, integument and psychiatric systems are negative, except as otherwise noted.      Objective    /82   Pulse 101   Temp 97.8  F (36.6  C) (Temporal)   Resp 20   Ht 1.64 m (5' 4.57\")   Wt 72.5 kg (159 lb 12.8 oz)   SpO2 99%   BMI 26.95 kg/m    Body mass index is 26.95 kg/m .  Physical Exam   Eye exam - right eye " normal lid, conjunctiva, cornea, pupil and fundus, left eye normal lid, conjunctiva, cornea, pupil and fundus.  Thyroid not palpable, not enlarged, no nodules detected.  CHEST:chest clear to IPPA, no tachypnea, retractions or cyanosis, and S1, S2 normal, no murmur, no gallop, rate regular.  Non thrombosed external hemorrhoids and some mildly engorged internal hemorrhoids noted. No fissures or ulcers.  Christ was seen today for rectal problem and health maintenance.    Diagnoses and all orders for this visit:    Benign essential hypertension  -     losartan (COZAAR) 100 MG tablet; Take 1 tablet (100 mg) by mouth daily    Rectal bleeding    Bleeding internal hemorrhoids  -     hydrocortisone, Perianal, (HYDROCORTISONE) 2.5 % cream; Place rectally 2 times daily as needed for hemorrhoids    work on lifestyle modification    Stool softeners. Fiber  Fluids. Sitz bathes   If condition persists or worsen I will refer him to colorectal.    Signed Electronically by: Tone Vanessa PA-C

## 2024-02-24 ENCOUNTER — TRANSFERRED RECORDS (OUTPATIENT)
Dept: HEALTH INFORMATION MANAGEMENT | Facility: CLINIC | Age: 54
End: 2024-02-24
Payer: COMMERCIAL

## 2024-05-07 ENCOUNTER — TELEPHONE (OUTPATIENT)
Dept: FAMILY MEDICINE | Facility: CLINIC | Age: 54
End: 2024-05-07
Payer: COMMERCIAL

## 2024-05-07 NOTE — TELEPHONE ENCOUNTER
Patient calling in to request refills of Asthma medications. RN sees Albuterol, Dulera and Singular on patients med list. RN see's some meds have not been refilled since 2022 but are still active on patients med list.     Patient states he needs to ensure this medications are filled asap as he is going out of town tomorrow. RN stated that med refill requests can take 1-2 days to process. Patient stated he is not currently having an asthma flare but wants to ensure he has these with him when out of town. RN recommend for fastest response to this request a virtual appointment would be appropriate. RN scheduled telephone appointment with PCP for tomorrow 5/8/24 at 12 pm to discuss asthma and treatment to ensure refills will be sent in.       Pratima Duque RN on 5/7/2024 at 2:42 PM

## 2024-05-08 ENCOUNTER — VIRTUAL VISIT (OUTPATIENT)
Dept: FAMILY MEDICINE | Facility: CLINIC | Age: 54
End: 2024-05-08
Payer: COMMERCIAL

## 2024-05-08 DIAGNOSIS — I10 BENIGN ESSENTIAL HYPERTENSION: ICD-10-CM

## 2024-05-08 DIAGNOSIS — J30.1 CHRONIC SEASONAL ALLERGIC RHINITIS DUE TO POLLEN: ICD-10-CM

## 2024-05-08 DIAGNOSIS — J45.41 MODERATE PERSISTENT ASTHMA WITH EXACERBATION: Primary | ICD-10-CM

## 2024-05-08 PROCEDURE — 99441 PR PHYSICIAN TELEPHONE EVALUATION 5-10 MIN: CPT | Mod: 93 | Performed by: PHYSICIAN ASSISTANT

## 2024-05-08 RX ORDER — MONTELUKAST SODIUM 10 MG/1
TABLET ORAL
Qty: 90 TABLET | Refills: 3 | Status: SHIPPED | OUTPATIENT
Start: 2024-05-08

## 2024-05-08 RX ORDER — ALBUTEROL SULFATE 90 UG/1
1-2 POWDER, METERED RESPIRATORY (INHALATION) EVERY 6 HOURS PRN
Qty: 1 EACH | Refills: 11 | Status: SHIPPED | OUTPATIENT
Start: 2024-05-08

## 2024-05-08 RX ORDER — LOSARTAN POTASSIUM 100 MG/1
100 TABLET ORAL DAILY
Qty: 90 TABLET | Refills: 1 | Status: SHIPPED | OUTPATIENT
Start: 2024-05-08

## 2024-05-08 ASSESSMENT — ASTHMA QUESTIONNAIRES
QUESTION_1 LAST FOUR WEEKS HOW MUCH OF THE TIME DID YOUR ASTHMA KEEP YOU FROM GETTING AS MUCH DONE AT WORK, SCHOOL OR AT HOME: A LITTLE OF THE TIME
QUESTION_2 LAST FOUR WEEKS HOW OFTEN HAVE YOU HAD SHORTNESS OF BREATH: ONCE OR TWICE A WEEK
ACT_TOTALSCORE: 21
ACT_TOTALSCORE: 21
QUESTION_4 LAST FOUR WEEKS HOW OFTEN HAVE YOU USED YOUR RESCUE INHALER OR NEBULIZER MEDICATION (SUCH AS ALBUTEROL): ONCE A WEEK OR LESS
QUESTION_5 LAST FOUR WEEKS HOW WOULD YOU RATE YOUR ASTHMA CONTROL: WELL CONTROLLED
QUESTION_3 LAST FOUR WEEKS HOW OFTEN DID YOUR ASTHMA SYMPTOMS (WHEEZING, COUGHING, SHORTNESS OF BREATH, CHEST TIGHTNESS OR PAIN) WAKE YOU UP AT NIGHT OR EARLIER THAN USUAL IN THE MORNING: NOT AT ALL

## 2024-05-08 NOTE — PROGRESS NOTES
Christ is a 53 year old who is being evaluated via a billable telephone visit.    What phone number would you like to be contacted at? [phone]  How would you like to obtain your AVS? Krystalharalicia  Originating Location (pt. Location): Home    Distant Location (provider location):  On-site      Subjective   Christ is a 53 year old, presenting for the following health issues:  Recheck Medication      5/8/2024     9:56 AM   Additional Questions   Roomed by Samantha   Accompanied by Self     History of Present Illness       Reason for visit:  Medication refill    He eats 2-3 servings of fruits and vegetables daily.He consumes 0 sweetened beverage(s) daily.He exercises with enough effort to increase his heart rate 10 to 19 minutes per day.  He exercises with enough effort to increase his heart rate 3 or less days per week.   He is taking medications regularly.  Blood pressure well controlled. Home numbers are within normal range.  No chest pain/sob/palpitations/dizziness/ha's  Asthma well controlled when using meds. No side effects         Review of Systems  Constitutional, neuro, ENT, endocrine, pulmonary, cardiac, gastrointestinal, genitourinary, musculoskeletal, integument and psychiatric systems are negative, except as otherwise noted.      Objective           Vitals:  No vitals were obtained today due to virtual visit.    Physical Exam   General: Alert and no distress //Respiratory: No audible wheeze, cough, or shortness of breath // Psychiatric:  Appropriate affect, tone, and pace of words      Christ was seen today for recheck medication.    Diagnoses and all orders for this visit:    Moderate persistent asthma with exacerbation  -     montelukast (SINGULAIR) 10 MG tablet; TAKE 1 TABLET(10 MG) BY MOUTH AT BEDTIME.  Due to be seen in April 2020  -     mometasone-formoterol (DULERA) 100-5 MCG/ACT inhaler; Inhale 2 puffs into the lungs 2 times daily  -     albuterol (PROAIR RESPICLICK) 108 (90 Base) MCG/ACT inhaler; Inhale  1-2 puffs into the lungs every 6 hours as needed for wheezing    Chronic seasonal allergic rhinitis due to pollen  -     montelukast (SINGULAIR) 10 MG tablet; TAKE 1 TABLET(10 MG) BY MOUTH AT BEDTIME.  Due to be seen in April 2020    Benign essential hypertension  -     losartan (COZAAR) 100 MG tablet; Take 1 tablet (100 mg) by mouth daily      Advised supportive and symptomatic treatment.  Follow up with Provider - if condition persists or worsens.   work on lifestyle modification        Phone call duration: 5 minutes  Signed Electronically by: Tone Vanessa PA-C

## 2024-05-09 ENCOUNTER — TELEPHONE (OUTPATIENT)
Dept: FAMILY MEDICINE | Facility: CLINIC | Age: 54
End: 2024-05-09
Payer: COMMERCIAL

## 2024-05-09 DIAGNOSIS — J45.41 MODERATE PERSISTENT ASTHMA WITH EXACERBATION: Primary | ICD-10-CM

## 2024-05-09 NOTE — TELEPHONE ENCOUNTER
Prior Authorization Retail Medication Request    Medication/Dose: albuterol (PROAIR RESPICLICK) 108 (90 Base) MCG/ACT inhaler  Diagnosis and ICD code (if different than what is on RX):  J45.41   New/renewal/insurance change PA/secondary ins. PA:  Previously Tried and Failed:  na  Rationale:  na    Insurance BCBS  Primary: [id number]   Insurance ID:  na    Secondary (if applicable):na  Insurance ID:   na    Pharmacy Information (if different than what is on RX)  [facility]  Phone:  [phone]  Fax:     [phone]

## 2024-05-09 NOTE — TELEPHONE ENCOUNTER
Prior Authorization Retail Medication Request    Medication/Dose: mometasone-formoterol (DULERA) 100-5 MCG/ACT inhaler  Diagnosis and ICD code (if different than what is on RX):  J45.41   New/renewal/insurance change PA/secondary ins. PA:  Previously Tried and Failed:  na  Rationale:  na    Insurance   Primary:  [facility]  Insurance ID:  [id number]     Secondary (if applicable):na  Insurance ID:  na    Pharmacy Information (if different than what is on RX)  [facility]  Phone:  [phone]  Fax:     [phone]

## 2024-05-23 NOTE — TELEPHONE ENCOUNTER
Retail Pharmacy Prior Authorization Team   Phone: [phone]    PA Initiation    Medication: PROAIR RESPICLICK 108 (90 BASE) MCG/ACT IN AEPB  Insurance Company: [employer] ([facility]) - Phone [phone] Fax [phone]  Pharmacy Filling the Rx: [id number] #[id number] - [place], [place] - [address] AT [facility] [address]  Filling Pharmacy Phone: [phone]  Filling Pharmacy Fax:    Start Date: 5/23/2024      Note: Due to record-high volumes, our turn-around time is taking longer than usual . We are currently 2 weeks behind in the pools.   We are working diligently to submit all requests in a timely manner and in the order they are received. Please only flag TRUE URGENT requests as high priority to the pool at this time.   If you have questions on status of PA's,  please send a note/message in the active PA encounter and send back to the [facility] PA pool [[id number]].    If you have questions about the turn-around time or about our process, please reach out to our supervisor Geeta Pierce.   Thank you!   RPPA (Retail Pharmacy Prior Authorization) team

## 2024-05-23 NOTE — TELEPHONE ENCOUNTER
PRIOR AUTHORIZATION DENIED    Medication: DULERA 100-5 MCG/ACT IN AERO    Insurance Company: [employer] ([facility]) - Phone [phone] Fax [phone]    Denial Date: 5/23/2024    Denial Reason(s): Patient needs to try and fail 2 preferred medications: Advair HFA, Breo Ellipta   and budesonide/formoterol.     Appeal Information:

## 2024-05-23 NOTE — TELEPHONE ENCOUNTER
PA Initiation    Medication: DULERA 100-5 MCG/ACT IN AERO  Insurance Company: [employer] ([facility]) - Phone [phone] Fax [phone]  Pharmacy Filling the Rx: [id number] - [place] [facility] [address] [facility] [address]  Filling Pharmacy Phone: [phone]  Filling Pharmacy Fax: [phone]  Start Date: 5/23/2024

## 2024-05-24 ENCOUNTER — TELEPHONE (OUTPATIENT)
Dept: FAMILY MEDICINE | Facility: CLINIC | Age: 54
End: 2024-05-24
Payer: COMMERCIAL

## 2024-05-24 DIAGNOSIS — J45.41 MODERATE PERSISTENT ASTHMA WITH EXACERBATION: ICD-10-CM

## 2024-05-24 RX ORDER — FLUTICASONE PROPIONATE AND SALMETEROL 100; 50 UG/1; UG/1
1 POWDER RESPIRATORY (INHALATION) EVERY 12 HOURS
Qty: 3 EACH | Refills: 1 | Status: SHIPPED | OUTPATIENT
Start: 2024-05-24

## 2024-05-24 RX ORDER — FLUTICASONE PROPIONATE AND SALMETEROL 100; 50 UG/1; UG/1
1 POWDER RESPIRATORY (INHALATION) EVERY 12 HOURS
Qty: 1 EACH | Refills: 5 | Status: SHIPPED | OUTPATIENT
Start: 2024-05-24 | End: 2024-05-24

## 2024-05-24 NOTE — TELEPHONE ENCOUNTER
RN called with  services.   RN left message to return call to clinic [phone].  (RN did not leave specific details on voicemail for confidential reasons)    Monique Schmidt RN on 5/24/2024 at 9:21 AM

## 2024-05-29 ENCOUNTER — TELEPHONE (OUTPATIENT)
Dept: FAMILY MEDICINE | Facility: CLINIC | Age: 54
End: 2024-05-29

## 2024-05-29 NOTE — TELEPHONE ENCOUNTER
.Prior Authorization Retail Medication Request    Medication/Dose: fluticasone-salmeterol (ADVAIR) 100-50 MCG/ACT inhaler  Diagnosis and ICD code (if different than what is on RX):  [J45.41]   New/renewal/insurance change PA/secondary ins. PA:  Previously Tried and Failed:  NA  Rationale:  NA    Insurance   Primary: BCBS OUT OF STATE   Insurance ID:  [id number]     Secondary (if applicable): NA  Insurance ID:  NA    Pharmacy Information (if different than what is on RX)  Name:    [facility] #[id number] - [place], [place] - [address] AT [facility] [address]     Phone:  [phone]   Fax: [phone]

## 2024-05-30 NOTE — TELEPHONE ENCOUNTER
Pt calling. RN offered  and pt declined. Provider's message relayed to pt. Pt will contact pharmacy. Pt asked about BP medication. RN advised that pt has refills remaining for Losartan. Pt had no further questions or concerns.    Jyoti Culp RN  [facility]

## 2024-05-30 NOTE — TELEPHONE ENCOUNTER
Retail Pharmacy Prior Authorization Team   Phone: [phone]    Prior Authorization Approval    Medication: PROAIR RESPICLICK 108 (90 BASE) MCG/ACT IN AEPB  Authorization Effective Date: 5/23/2024  Authorization Expiration Date: 5/23/2025  Insurance Company: [employer] ([facility]) - Phone [phone] Fax [phone]  Which Pharmacy is filling the prescription: [facility] #[id number] - [place], [place] - [address] AT [facility] [address]  Pharmacy Notified: YES  Patient Notified: YES **Instructed pharmacy to notify patient when script is ready to /ship.**

## 2024-05-30 NOTE — TELEPHONE ENCOUNTER
Called . Did they answer the phone: No, left a message on voicemail to return call to the [facility] at [phone], and to ask for any available triage nurse.  (RN did not leave specific details on voicemail for confidential reasons)    Samaria BEGUMN RN  Triage Nurse  [facility]

## 2024-06-10 NOTE — TELEPHONE ENCOUNTER
PRIOR AUTHORIZATION DENIED    Medication: FLUTICASONE-SALMETEROL 100-50 MCG/ACT IN AEPB    Insurance Company: [employer] ([facility]) - Phone [phone] Fax [phone]    Denial Date: 6/10/2024    Denial Reason(s): Patient needs to try and fail 1 preferred medication: advair HFA, Breo Ellipta or   brand Symbicort.     Appeal Information:

## 2024-06-10 NOTE — TELEPHONE ENCOUNTER
PA Initiation    Medication: FLUTICASONE-SALMETEROL 100-50 MCG/ACT IN AEPB  Insurance Company: [employer] ([facility]) - Phone [phone] Fax [phone]  Pharmacy Filling the Rx: [facility] #[id number] - [place], [place] - [address] [facility] AT [facility] [address]  Filling Pharmacy Phone: [phone]  Filling Pharmacy Fax: [phone]  Start Date: 6/9/2024

## 2025-02-24 ENCOUNTER — OFFICE VISIT (OUTPATIENT)
Dept: FAMILY MEDICINE | Facility: CLINIC | Age: 55
End: 2025-02-24
Payer: COMMERCIAL

## 2025-02-24 ENCOUNTER — ANCILLARY PROCEDURE (OUTPATIENT)
Dept: GENERAL RADIOLOGY | Facility: CLINIC | Age: 55
End: 2025-02-24
Attending: PHYSICIAN ASSISTANT
Payer: COMMERCIAL

## 2025-02-24 VITALS
SYSTOLIC BLOOD PRESSURE: 148 MMHG | OXYGEN SATURATION: 99 % | RESPIRATION RATE: 18 BRPM | HEIGHT: 65 IN | DIASTOLIC BLOOD PRESSURE: 104 MMHG | HEART RATE: 72 BPM | BODY MASS INDEX: 27.02 KG/M2 | WEIGHT: 162.2 LBS | TEMPERATURE: 97.9 F

## 2025-02-24 DIAGNOSIS — Z13.1 SCREENING FOR DIABETES MELLITUS: ICD-10-CM

## 2025-02-24 DIAGNOSIS — I10 BENIGN ESSENTIAL HYPERTENSION: ICD-10-CM

## 2025-02-24 DIAGNOSIS — R35.0 URINARY FREQUENCY: ICD-10-CM

## 2025-02-24 DIAGNOSIS — J45.30 MILD PERSISTENT ASTHMA WITHOUT COMPLICATION: Primary | ICD-10-CM

## 2025-02-24 DIAGNOSIS — M54.50 ACUTE MIDLINE LOW BACK PAIN WITHOUT SCIATICA: ICD-10-CM

## 2025-02-24 DIAGNOSIS — Z12.5 SCREENING FOR MALIGNANT NEOPLASM OF PROSTATE: ICD-10-CM

## 2025-02-24 PROBLEM — J45.41 MODERATE PERSISTENT ASTHMA WITH EXACERBATION: Status: RESOLVED | Noted: 2018-06-18 | Resolved: 2025-02-24

## 2025-02-24 LAB
ALBUMIN UR-MCNC: NEGATIVE MG/DL
APPEARANCE UR: CLEAR
BILIRUB UR QL STRIP: NEGATIVE
COLOR UR AUTO: YELLOW
EST. AVERAGE GLUCOSE BLD GHB EST-MCNC: 117 MG/DL
GLUCOSE UR STRIP-MCNC: NEGATIVE MG/DL
HBA1C MFR BLD: 5.7 % (ref 0–5.6)
HGB UR QL STRIP: NEGATIVE
KETONES UR STRIP-MCNC: NEGATIVE MG/DL
LEUKOCYTE ESTERASE UR QL STRIP: NEGATIVE
NITRATE UR QL: NEGATIVE
PH UR STRIP: 7 [PH] (ref 5–7)
SP GR UR STRIP: 1.01 (ref 1–1.03)
UROBILINOGEN UR STRIP-ACNC: 0.2 E.U./DL

## 2025-02-24 PROCEDURE — 80048 BASIC METABOLIC PNL TOTAL CA: CPT | Performed by: PHYSICIAN ASSISTANT

## 2025-02-24 PROCEDURE — 72100 X-RAY EXAM L-S SPINE 2/3 VWS: CPT | Mod: TC | Performed by: RADIOLOGY

## 2025-02-24 PROCEDURE — 81003 URINALYSIS AUTO W/O SCOPE: CPT | Performed by: PHYSICIAN ASSISTANT

## 2025-02-24 PROCEDURE — G2211 COMPLEX E/M VISIT ADD ON: HCPCS | Performed by: PHYSICIAN ASSISTANT

## 2025-02-24 PROCEDURE — 36415 COLL VENOUS BLD VENIPUNCTURE: CPT | Performed by: PHYSICIAN ASSISTANT

## 2025-02-24 PROCEDURE — 99214 OFFICE O/P EST MOD 30 MIN: CPT | Performed by: PHYSICIAN ASSISTANT

## 2025-02-24 PROCEDURE — G0103 PSA SCREENING: HCPCS | Performed by: PHYSICIAN ASSISTANT

## 2025-02-24 PROCEDURE — 83036 HEMOGLOBIN GLYCOSYLATED A1C: CPT | Performed by: PHYSICIAN ASSISTANT

## 2025-02-24 RX ORDER — LOSARTAN POTASSIUM 100 MG/1
100 TABLET ORAL DAILY
Qty: 90 TABLET | Refills: 0 | Status: SHIPPED | OUTPATIENT
Start: 2025-02-24

## 2025-02-24 RX ORDER — FLUTICASONE PROPIONATE AND SALMETEROL 100; 50 UG/1; UG/1
1 POWDER RESPIRATORY (INHALATION) EVERY 12 HOURS
Qty: 3 EACH | Refills: 1 | Status: CANCELLED | OUTPATIENT
Start: 2025-02-24

## 2025-02-24 RX ORDER — HYDROCHLOROTHIAZIDE 25 MG/1
25 TABLET ORAL DAILY
Status: CANCELLED | OUTPATIENT
Start: 2025-02-24

## 2025-02-24 RX ORDER — ALBUTEROL SULFATE 90 UG/1
1-2 POWDER, METERED RESPIRATORY (INHALATION) EVERY 6 HOURS PRN
Qty: 1 EACH | Refills: 2 | Status: SHIPPED | OUTPATIENT
Start: 2025-02-24

## 2025-02-24 ASSESSMENT — ASTHMA QUESTIONNAIRES
QUESTION_4 LAST FOUR WEEKS HOW OFTEN HAVE YOU USED YOUR RESCUE INHALER OR NEBULIZER MEDICATION (SUCH AS ALBUTEROL): NOT AT ALL
ACT_TOTALSCORE: 21
QUESTION_2 LAST FOUR WEEKS HOW OFTEN HAVE YOU HAD SHORTNESS OF BREATH: THREE TO SIX TIMES A WEEK
QUESTION_3 LAST FOUR WEEKS HOW OFTEN DID YOUR ASTHMA SYMPTOMS (WHEEZING, COUGHING, SHORTNESS OF BREATH, CHEST TIGHTNESS OR PAIN) WAKE YOU UP AT NIGHT OR EARLIER THAN USUAL IN THE MORNING: NOT AT ALL
ACT_TOTALSCORE: 21
QUESTION_5 LAST FOUR WEEKS HOW WOULD YOU RATE YOUR ASTHMA CONTROL: COMPLETELY CONTROLLED
QUESTION_1 LAST FOUR WEEKS HOW MUCH OF THE TIME DID YOUR ASTHMA KEEP YOU FROM GETTING AS MUCH DONE AT WORK, SCHOOL OR AT HOME: SOME OF THE TIME

## 2025-02-24 ASSESSMENT — PAIN SCALES - GENERAL: PAINLEVEL_OUTOF10: MODERATE PAIN (5)

## 2025-02-24 ASSESSMENT — PATIENT HEALTH QUESTIONNAIRE - PHQ9
SUM OF ALL RESPONSES TO PHQ QUESTIONS 1-9: 10
10. IF YOU CHECKED OFF ANY PROBLEMS, HOW DIFFICULT HAVE THESE PROBLEMS MADE IT FOR YOU TO DO YOUR WORK, TAKE CARE OF THINGS AT HOME, OR GET ALONG WITH OTHER PEOPLE: SOMEWHAT DIFFICULT
SUM OF ALL RESPONSES TO PHQ QUESTIONS 1-9: 10

## 2025-02-24 NOTE — PROGRESS NOTES
"  Assessment & Plan     Mild persistent asthma without complication    - albuterol (PROAIR RESPICLICK) 108 (90 Base) MCG/ACT inhaler; Inhale 1-2 puffs into the lungs every 6 hours as needed for wheezing.    Acute midline low back pain without sciatica  Follow up if symptoms worsen or change. To ER with severe worsening symptoms.   Referred to pt also  I will follow up with labs and xray      - XR Lumbar Spine 2/3 Views; Future  - tiZANidine (ZANAFLEX) 4 MG tablet; Take 1 tablet (4 mg) by mouth 3 times daily as needed for muscle spasms.    Benign essential hypertension  Not to goal but not taking med daily  Add hctz next if needed  See patient instructions below for more plan.    - losartan (COZAAR) 100 MG tablet; Take 1 tablet (100 mg) by mouth daily. Recheck blood pressure 4 weeks in clinic appt please  - Basic metabolic panel  (Ca, Cl, CO2, Creat, Gluc, K, Na, BUN); Future    Urinary frequency  I will follow up with labs   Checking a1c also and psa  To urology in future if needed  - UA Macroscopic with reflex to Microscopic and Culture - Lab Collect; Future    Screening for diabetes mellitus    - Hemoglobin A1c; Future    Screening for malignant neoplasm of prostate    - PSA, screen; Future          BMI  Estimated body mass index is 27.02 kg/m  as calculated from the following:    Height as of this encounter: 1.65 m (5' 4.96\").    Weight as of this encounter: 73.6 kg (162 lb 3.2 oz).   Weight management plan: Discussed healthy diet and exercise guidelines    Depression Screening Follow Up        2/24/2025     7:12 AM   PHQ   PHQ-9 Total Score 10    Q9: Thoughts of better off dead/self-harm past 2 weeks Not at all       Patient-reported     The longitudinal plan of care for the diagnosis(es)/condition(s) as documented were addressed during this visit. Due to the added complexity in care, I will continue to support Christ in the subsequent management and with ongoing continuity of care.    Patient Instructions " "  Schedule physical therapy  Recheck blood pressure 3-4 weeks take losartan daily and every day      Sher Polo is a 54 year old, presenting for the following health issues:  Illness        2/24/2025     7:23 AM   Additional Questions   Roomed by Doreen MOY MA   Accompanied by n/a         2/24/2025     7:23 AM   Patient Reported Additional Medications   Patient reports taking the following new medications No Medications Missing     History of Present Illness       Back Pain:  He presents for follow up of back pain. Patient's back pain is a new problem.    Original cause of back pain: not sure  First noticed back pain: 1-4 weeks ago  Patient feels back pain: a few times per weekLocation of back pain:  Left shoulder, right side of waist and left side of waist  Description of back pain: other  Back pain spreads: left knee    Since patient first noticed back pain, pain is: unchanged  Does back pain interfere with his job:  Not applicable  On a scale of 1-10 (10 being the worst), patient describes pain as:  9  What makes back pain worse: bending, coughing, certain positions, lying down, standing and other   Acupuncture: not tried  Acetaminophen: not tried  Activity or exercise: helpful  Chiropractor:  Not tried  Cold: not tried  Heat: not tried  Massage: not helpful  Muscle relaxants: not tried  NSAIDS: not tried  Opioids: not tried  Physical Therapy: not tried  Rest: helpful  Steroid Injection: not tried  Stretching: helpful  Surgery: not tried  TENS unit: not tried  Topical pain relievers: helpful  Other healthcare providers patient is seeing for back pain: None    Headaches:   Since the patient's last clinic visit, headaches are: worsened  The patient is getting headaches:  2 to 3times a week  He is able to do normal daily activities when he has a migraine.  The patient is taking the following rescue/relief medications:  Tylenol   Patient states \"I get only a small amount of relief\" from the rescue/relief " "medications.   The patient is taking the following medications to prevent migraines:  No medications to prevent migraines  In the past 4 weeks, the patient has gone to an Urgent Care or Emergency Room 0 times times due to headaches. He is missing 7 dose(s) of medications per week.  He is not taking prescribed medications regularly due to remembering to take.     New patient to me:    Specific injury?--- NO  Onset--- 3 weeks or more  Red flags such as fevers, loss of bowel or bladder continence, saddle numbness, or cancer history?  --- NO  Radiation of pain? --- NO  Leg weakness?--- NO  Is using tylenol already.     Blood pressure is high suspect this is making headaches worse. Taking losartan? Not daily, he feels less headache when he actually takes it. Some urinary frequency and nocturia. Overdue for labs.     Needs inhaler refilled per patient.   Act to goal. Mild asthma          Objective    BP (!) 160/108   Pulse 72   Temp 97.9  F (36.6  C) (Temporal)   Resp 18   Ht 1.65 m (5' 4.96\")   Wt 73.6 kg (162 lb 3.2 oz)   SpO2 99%   BMI 27.02 kg/m    Body mass index is 27.02 kg/m .  Physical Exam   GENERAL:  No acute distress.  Interacts and answers questions appropriately.  Alert and oriented.  HEENT:   Oral mucosa moist.  Posterior pharynx non-erythematous.   NECK:  Soft and supple.  without tenderness.    Normal range of motion.    CARDIAC:   Regular rate and rhythm.  No murmurs, rubs, or gallops.   PULMONARY: Clear to auscultation bilaterally.  No  wheezes, crackles, or rhonchi.  Normal air exchange/breath sounds.   MUSCULOSKELETAL:  Low back non-tender..   Not tender over lumbar vertebrae.    No erythema, ecchymosis, edema, or warmth.  Range of motion normal with lateral bending, forward flexion, and extension or not assessed well due to pain.  Hip, knee, and ankle strength 5/5 and equal bilaterally.    Straight leg raise negative.  NEUROLOGICAL:  Alert and oriented.  Gait normal.  Cranial nerves II-XII " grossly intact.    PSYCH: Normal affect.  SKIN: No rashes.            Signed Electronically by: Angelica Severino PA-C

## 2025-02-24 NOTE — LETTER
February 25, 2025      Christ Gomes  [address] [facility] [id number]        Dear ,    We are writing to inform you of your test results.    It was a pleasure to see you at your recent office visit.  Your test results are listed below.  Mild arthritis in spine. Start with physical therapy referral has already been placed for back pain.     Resulted Orders   XR Lumbar Spine 2/3 Views    Narrative    EXAM: XR LUMBAR SPINE 2/3 VIEWS  LOCATION: [facility]  DATE: 02/24/2025    INDICATION: Pain for weeks. No injury.  COMPARISON: None.      Impression    IMPRESSION: Five lumbar-type vertebrae. Normal alignment. Vertebral body heights normal. No fractures. Probable mild facet arthropathy at L4-L5 and L5-S1. No other significant degenerative change.         If you have any questions or concerns, please call the clinic at the number listed above.       Sincerely,      Angelica Severino PA-C    Electronically signed

## 2025-02-25 ENCOUNTER — APPOINTMENT (OUTPATIENT)
Dept: INTERPRETER SERVICES | Facility: CLINIC | Age: 55
End: 2025-02-25
Payer: COMMERCIAL

## 2025-02-25 ENCOUNTER — TELEPHONE (OUTPATIENT)
Dept: FAMILY MEDICINE | Facility: CLINIC | Age: 55
End: 2025-02-25
Payer: COMMERCIAL

## 2025-02-25 LAB
ANION GAP SERPL CALCULATED.3IONS-SCNC: 9 MMOL/L (ref 7–15)
BUN SERPL-MCNC: 14.6 MG/DL (ref 6–20)
CALCIUM SERPL-MCNC: 9.3 MG/DL (ref 8.8–10.4)
CHLORIDE SERPL-SCNC: 105 MMOL/L (ref 98–107)
CREAT SERPL-MCNC: 0.82 MG/DL (ref 0.67–1.17)
EGFRCR SERPLBLD CKD-EPI 2021: >90 ML/MIN/1.73M2
GLUCOSE SERPL-MCNC: 107 MG/DL (ref 70–99)
HCO3 SERPL-SCNC: 27 MMOL/L (ref 22–29)
POTASSIUM SERPL-SCNC: 4.8 MMOL/L (ref 3.4–5.3)
PSA SERPL DL<=0.01 NG/ML-MCNC: 0.79 NG/ML (ref 0–3.5)
SODIUM SERPL-SCNC: 141 MMOL/L (ref 135–145)

## 2025-02-25 NOTE — TELEPHONE ENCOUNTER
----- Message from Angelica Sveerino sent at 2/25/2025  7:24 AM CST -----  PLEASE CALL PATIENT:  Dear Christ,      It was a pleasure to see you at your recent office visit.  Your test results are listed below.  A1C was 5.7 which is consistent with the diagnosis of pre-diabetes. This means your blood sugars have been above the normal range but not yet in the diabetic range. This means you are at increased risk of developing diabetes over time.  Lifestyle changes can help with this. Increasing your activity, even walking more every day can help.  Decreasing your sugar and carbohydrate intake can also help.  For example consuming less white bread, pasta, chips/pretzels, soda, juice and eating more vegetables and lean protein. When you do have carbohydrates choosing 100 percent whole wheat is better than white bread for example.  We should recheck your A1C in 6 months. Sodium and potassium normal. .  Creatinine and GFR normal, which means kidney function is normal. Prostate cancer screening test normal (PSA). Urine test is normal.                 If you have any questions or concerns, please call the clinic at [phone].    Sincerely,  Angelica Severino PA-C

## 2025-02-25 NOTE — RESULT ENCOUNTER NOTE
PLEASE CALL PATIENT:  Dear Christ,      It was a pleasure to see you at your recent office visit.  Your test results are listed below.  A1C was 5.7 which is consistent with the diagnosis of pre-diabetes. This means your blood sugars have been above the normal range but not yet in the diabetic range. This means you are at increased risk of developing diabetes over time.  Lifestyle changes can help with this. Increasing your activity, even walking more every day can help.  Decreasing your sugar and carbohydrate intake can also help.  For example consuming less white bread, pasta, chips/pretzels, soda, juice and eating more vegetables and lean protein. When you do have carbohydrates choosing 100 percent whole wheat is better than white bread for example.  We should recheck your A1C in 6 months. Sodium and potassium normal. .  Creatinine and GFR normal, which means kidney function is normal. Prostate cancer screening test normal (PSA). Urine test is normal.                 If you have any questions or concerns, please call the clinic at [phone].    Sincerely,  Angelica Severino PA-C

## 2025-02-25 NOTE — RESULT ENCOUNTER NOTE
Dear Christ,      It was a pleasure to see you at your recent office visit.  Your test results are listed below.  Mild arthritis in spine. Start with physical therapy referral has already been placed for back pain.         If you have any questions or concerns, please call the clinic at [phone].    Sincerely,  Angelica Severino PA-C

## 2025-02-25 NOTE — LETTER
February 25, 2025      Christ Gomes  [address] [facility] [id number]        Dear ,    We are writing to inform you of your test results.    It was a pleasure to see you at your recent office visit. Your test results are listed below. A1C was 5.7 which is consistent with the diagnosis of pre-diabetes. This means your blood sugars have been above the normal range but not yet in the diabetic range. This means you are at increased risk of developing diabetes over time. Lifestyle changes can help with this. Increasing your activity, even walking more every day can help. Decreasing your sugar and carbohydrate intake can also help. For example consuming less white bread, pasta, chips/pretzels, soda, juice and eating more vegetables and lean protein. When you do have carbohydrates choosing 100 percent whole wheat is better than white bread for example. We should recheck your A1C in 6 months. Sodium and potassium normal. . Creatinine and GFR normal, which means kidney function is normal. Prostate cancer screening test normal (PSA). Urine test is normal.     Resulted Orders   Basic metabolic panel  (Ca, Cl, CO2, Creat, Gluc, K, Na, BUN)   Result Value Ref Range    Sodium 141 135 - 145 mmol/L    Potassium 4.8 3.4 - 5.3 mmol/L    Chloride 105 98 - 107 mmol/L    Carbon Dioxide (CO2) 27 22 - 29 mmol/L    Anion Gap 9 7 - 15 mmol/L    Urea Nitrogen 14.6 6.0 - 20.0 mg/dL    Creatinine 0.82 0.67 - 1.17 mg/dL    GFR Estimate >90 >60 mL/min/1.73m2      Comment:      eGFR calculated using 2021 CKD-EPI equation.    Calcium 9.3 8.8 - 10.4 mg/dL    Glucose 107 (H) 70 - 99 mg/dL   Hemoglobin A1c   Result Value Ref Range    Estimated Average Glucose 117 (H) <117 mg/dL    Hemoglobin A1C 5.7 (H) 0.0 - 5.6 %      Comment:      Normal <5.7%   Prediabetes 5.7-6.4%    Diabetes 6.5% or higher     Note: Adopted from ADA consensus guidelines.   PSA, screen   Result Value Ref Range    Prostate Specific Antigen Screen 0.79 0.00 - 3.50  ng/mL    Narrative    This result is obtained using the Roche Elecsys total PSA method on the krishna e801 immunoassay analyzer, which is an ultrasensitive method. Results obtained with different assay methods or kits cannot be used interchangeably.  This test is intended for initial prostate cancer screening. PSA values exceeding the age-specific limits are suspicious for prostate disease, but additional testing, such as prostate biopsy, is needed to diagnose prostate pathology. The American Cancer Society recommends annual examination with digital rectal examination and serum PSA beginning at age 50 and for men with a life expectancy of at least 10 years after detection of prostate cancer. For men in high-risk groups, such as  Americans or men with a first-degree relative diagnosed at a younger age, testing should begin at a younger age. It is generally recommended that information be provided to patients about the benefits and limitations of testing and treatment so they can make informed decisions.   UA Macroscopic with reflex to Microscopic and Culture - Lab Collect   Result Value Ref Range    Color Urine Yellow Colorless, Straw, Light Yellow, Yellow    Appearance Urine Clear Clear    Glucose Urine Negative Negative mg/dL    Bilirubin Urine Negative Negative    Ketones Urine Negative Negative mg/dL    Specific Gravity Urine 1.010 1.003 - 1.035    Blood Urine Negative Negative    pH Urine 7.0 5.0 - 7.0    Protein Albumin Urine Negative Negative mg/dL    Urobilinogen Urine 0.2 0.2, 1.0 E.U./dL    Nitrite Urine Negative Negative    Leukocyte Esterase Urine Negative Negative    Narrative    Microscopic not indicated       If you have any questions or concerns, please call the clinic at the number listed above.       Sincerely,          Electronically signed

## 2025-02-25 NOTE — TELEPHONE ENCOUNTER
Attempted to call patient with [facility]  to relay provider's result note below with no answer, left voicemail to call clinic back at [phone].     Radha Devine, RN on 2/25/2025 at 8:45 AM

## 2025-02-25 NOTE — TELEPHONE ENCOUNTER
Spoke with patient, relayed providers result note below and patient verbalized understanding. Pt stated no further questions and requesting results to be mailed to him.     Routing to team to send results with result note from provider.     Radha Devine RN on 2/25/2025 at 9:42 AM

## 2025-03-20 ENCOUNTER — OFFICE VISIT (OUTPATIENT)
Dept: FAMILY MEDICINE | Facility: CLINIC | Age: 55
End: 2025-03-20
Payer: COMMERCIAL

## 2025-03-20 VITALS
WEIGHT: 159.6 LBS | TEMPERATURE: 98.1 F | BODY MASS INDEX: 26.59 KG/M2 | SYSTOLIC BLOOD PRESSURE: 119 MMHG | RESPIRATION RATE: 20 BRPM | HEIGHT: 65 IN | OXYGEN SATURATION: 99 % | DIASTOLIC BLOOD PRESSURE: 87 MMHG | HEART RATE: 66 BPM

## 2025-03-20 DIAGNOSIS — I10 BENIGN ESSENTIAL HYPERTENSION: Primary | ICD-10-CM

## 2025-03-20 DIAGNOSIS — R73.03 PREDIABETES: ICD-10-CM

## 2025-03-20 DIAGNOSIS — J45.30 MILD PERSISTENT ASTHMA WITHOUT COMPLICATION: ICD-10-CM

## 2025-03-20 RX ORDER — LOSARTAN POTASSIUM 100 MG/1
100 TABLET ORAL DAILY
Qty: 90 TABLET | Refills: 1 | Status: SHIPPED | OUTPATIENT
Start: 2025-03-20

## 2025-03-20 ASSESSMENT — ASTHMA QUESTIONNAIRES: ACT_TOTALSCORE: 22

## 2025-03-20 NOTE — PROGRESS NOTES
Sher Polo is a 54 year old, presenting for the following health issues:  Hypertension and Health Maintenance (Patient is not fasting)      3/20/2025     3:10 PM   Additional Questions   Roomed by Isabel Coy CMA   Accompanied by None         3/20/2025     3:10 PM   Patient Reported Additional Medications   Patient reports taking the following new medications none     History of Present Illness       Diabetes:   He presents for follow up of diabetes.    He is not checking blood glucose.         He has no concerns regarding his diabetes at this time.   He is not experiencing numbness or burning in feet, excessive thirst, blurry vision, weight changes or redness, sores or blisters on feet.           Hypertension: He presents for follow up of hypertension.  He does check blood pressure  regularly outside of the clinic. Outside blood pressures have been over 140/90. He does not follow a low salt diet.     He eats 2-3 servings of fruits and vegetables daily.He consumes 0 sweetened beverage(s) daily.He exercises with enough effort to increase his heart rate 20 to 29 minutes per day.  He exercises with enough effort to increase his heart rate 5 days per week. He is missing 1 dose(s) of medications per week.            3/20/2025   Asthma   1.  In the past 4 weeks, how much of the time did your asthma keep you from getting as much done at work, school or at home? 5   2.  During the past 4 weeks, how often have you had shortness of breath? 5   3.  During the past 4 weeks, how often did your asthma symptoms (wheezing, coughing, shortness of breath, chest tightness or pain) wake you up at night or earlier than usual in the morning? 4   4.  During the past 4 weeks, how often have you used your rescue inhaler or nebulizer medication (such as albuterol)? 5   5.  How would you rate your asthma control during the past 4 weeks? 3   ACT TOTAL SCORE (Goal Greater than or Equal to 20) 22    In the past 12 months, how  "many times did you visit the emergency room for your asthma without being admitted to the hospital? 0   In the past 12 months, how many times were you hospitalized overnight because of your asthma? 0   Do you have a cough, wheezing or shortness of breath? (!) TROUBLE WITH BREATHING (SHORTNESS OF BREATH)   What makes your asthma/breathing worse? Animal dander    Strong odors and fumes    Cold air   Do you want more information about how to use your inhaler? (!) YES       Patient-reported    Multiple values from one day are sorted in reverse-chronological order         Asthma has been well controlled. Rarely requires use of his albuterol.         Review of Systems  Constitutional, HEENT, cardiovascular, pulmonary, GI, , musculoskeletal, neuro, skin, endocrine and psych systems are negative, except as otherwise noted.      Objective    Pulse 66   Temp 98.1  F (36.7  C) (Oral)   Resp 20   Ht 1.638 m (5' 4.5\")   Wt 72.4 kg (159 lb 9.6 oz)   SpO2 99%   BMI 26.97 kg/m    Body mass index is 26.97 kg/m .  Physical Exam   Eye exam - right eye normal lid, conjunctiva, cornea, pupil and fundus, left eye normal lid, conjunctiva, cornea, pupil and fundus.  Thyroid not palpable, not enlarged, no nodules detected.  CHEST:chest clear to IPPA, no tachypnea, retractions or cyanosis, and S1, S2 normal, no murmur, no gallop, rate regular.  Christ was seen today for hypertension and health maintenance.    Diagnoses and all orders for this visit:    Benign essential hypertension  -     losartan (COZAAR) 100 MG tablet; Take 1 tablet (100 mg) by mouth daily.    Prediabetes  Continue to work on a lower sugar/starch diet and more exercise.    Other orders  -     REVIEW OF HEALTH MAINTENANCE PROTOCOL ORDERS  Christ was seen today for hypertension and health maintenance.      Mild persistent asthma without complication  Continue current treatment plan          Continue all meds.  Recheck in 6 mos     Signed Electronically by: Tone Hurt " BHAVANA Vanessa

## (undated) DEVICE — SOL WATER IRRIG 1000ML BOTTLE 07139-09

## (undated) RX ORDER — FENTANYL CITRATE 50 UG/ML
INJECTION, SOLUTION INTRAMUSCULAR; INTRAVENOUS
Status: DISPENSED
Start: 2022-03-14